# Patient Record
Sex: FEMALE | Race: WHITE | NOT HISPANIC OR LATINO | Employment: OTHER | URBAN - METROPOLITAN AREA
[De-identification: names, ages, dates, MRNs, and addresses within clinical notes are randomized per-mention and may not be internally consistent; named-entity substitution may affect disease eponyms.]

---

## 2017-02-07 ENCOUNTER — LAB (OUTPATIENT)
Dept: INTERNAL MEDICINE | Facility: CLINIC | Age: 57
End: 2017-02-07

## 2017-02-07 DIAGNOSIS — N39.3 STRESS INCONTINENCE IN FEMALE: ICD-10-CM

## 2017-02-07 DIAGNOSIS — M85.80 OSTEOPENIA: ICD-10-CM

## 2017-02-07 DIAGNOSIS — E55.9 VITAMIN D DEFICIENCY: ICD-10-CM

## 2017-02-07 DIAGNOSIS — E78.1 PURE HYPERGLYCERIDEMIA: Primary | ICD-10-CM

## 2017-02-07 DIAGNOSIS — Z00.00 ANNUAL PHYSICAL EXAM: ICD-10-CM

## 2017-02-07 LAB
25(OH)D3 SERPL-MCNC: 32.1 NG/ML
ALBUMIN SERPL-MCNC: 4.5 G/DL (ref 3.2–4.8)
ALBUMIN/GLOB SERPL: 1.5 G/DL (ref 1.5–2.5)
ALP SERPL-CCNC: 64 U/L (ref 25–100)
ALT SERPL W P-5'-P-CCNC: 34 U/L (ref 7–40)
ANION GAP SERPL CALCULATED.3IONS-SCNC: 7 MMOL/L (ref 3–11)
AST SERPL-CCNC: 35 U/L (ref 0–33)
BACTERIA UR QL AUTO: NORMAL /HPF
BASOPHILS # BLD AUTO: 0.04 10*3/MM3 (ref 0–0.2)
BASOPHILS NFR BLD AUTO: 0.8 % (ref 0–1)
BILIRUB SERPL-MCNC: 0.5 MG/DL (ref 0.3–1.2)
BILIRUB UR QL STRIP: NEGATIVE
BUN BLD-MCNC: 14 MG/DL (ref 9–23)
BUN/CREAT SERPL: 17.5 (ref 7–25)
CALCIUM SPEC-SCNC: 10.4 MG/DL (ref 8.7–10.4)
CHLORIDE SERPL-SCNC: 101 MMOL/L (ref 99–109)
CK SERPL-CCNC: 189 U/L (ref 26–174)
CLARITY UR: CLEAR
CO2 SERPL-SCNC: 29 MMOL/L (ref 20–31)
COLOR UR: YELLOW
CREAT BLD-MCNC: 0.8 MG/DL (ref 0.6–1.3)
DEPRECATED RDW RBC AUTO: 42.4 FL (ref 37–54)
EOSINOPHIL # BLD AUTO: 0.34 10*3/MM3 (ref 0.1–0.3)
EOSINOPHIL NFR BLD AUTO: 6.6 % (ref 0–3)
ERYTHROCYTE [DISTWIDTH] IN BLOOD BY AUTOMATED COUNT: 13 % (ref 11.3–14.5)
GFR SERPL CREATININE-BSD FRML MDRD: 74 ML/MIN/1.73
GLOBULIN UR ELPH-MCNC: 3 GM/DL
GLUCOSE BLD-MCNC: 89 MG/DL (ref 70–100)
GLUCOSE UR STRIP-MCNC: NEGATIVE MG/DL
HCT VFR BLD AUTO: 40.8 % (ref 34.5–44)
HCV AB SER DONR QL: NORMAL
HGB BLD-MCNC: 13.3 G/DL (ref 11.5–15.5)
HGB UR QL STRIP.AUTO: NEGATIVE
HYALINE CASTS UR QL AUTO: NORMAL /LPF
IMM GRANULOCYTES # BLD: 0.01 10*3/MM3 (ref 0–0.03)
IMM GRANULOCYTES NFR BLD: 0.2 % (ref 0–0.6)
KETONES UR QL STRIP: NEGATIVE
LEUKOCYTE ESTERASE UR QL STRIP.AUTO: ABNORMAL
LYMPHOCYTES # BLD AUTO: 1.61 10*3/MM3 (ref 0.6–4.8)
LYMPHOCYTES NFR BLD AUTO: 31 % (ref 24–44)
MCH RBC QN AUTO: 29.1 PG (ref 27–31)
MCHC RBC AUTO-ENTMCNC: 32.6 G/DL (ref 32–36)
MCV RBC AUTO: 89.3 FL (ref 80–99)
MONOCYTES # BLD AUTO: 0.5 10*3/MM3 (ref 0–1)
MONOCYTES NFR BLD AUTO: 9.6 % (ref 0–12)
NEUTROPHILS # BLD AUTO: 2.69 10*3/MM3 (ref 1.5–8.3)
NEUTROPHILS NFR BLD AUTO: 51.8 % (ref 41–71)
NITRITE UR QL STRIP: NEGATIVE
PH UR STRIP.AUTO: 5.5 [PH] (ref 5–8)
PLATELET # BLD AUTO: 337 10*3/MM3 (ref 150–450)
PMV BLD AUTO: 10 FL (ref 6–12)
POTASSIUM BLD-SCNC: 4.6 MMOL/L (ref 3.5–5.5)
PROT SERPL-MCNC: 7.5 G/DL (ref 5.7–8.2)
PROT UR QL STRIP: NEGATIVE
RBC # BLD AUTO: 4.57 10*6/MM3 (ref 3.89–5.14)
RBC # UR: NORMAL /HPF
REF LAB TEST METHOD: NORMAL
SODIUM BLD-SCNC: 137 MMOL/L (ref 132–146)
SP GR UR STRIP: 1.01 (ref 1–1.03)
SQUAMOUS #/AREA URNS HPF: NORMAL /HPF
T4 FREE SERPL-MCNC: 1.13 NG/DL (ref 0.89–1.76)
TSH SERPL DL<=0.05 MIU/L-ACNC: 3.21 MIU/ML (ref 0.35–5.35)
UROBILINOGEN UR QL STRIP: ABNORMAL
WBC NRBC COR # BLD: 5.19 10*3/MM3 (ref 3.5–10.8)
WBC UR QL AUTO: NORMAL /HPF

## 2017-02-07 PROCEDURE — 81001 URINALYSIS AUTO W/SCOPE: CPT | Performed by: INTERNAL MEDICINE

## 2017-02-07 PROCEDURE — 36415 COLL VENOUS BLD VENIPUNCTURE: CPT | Performed by: INTERNAL MEDICINE

## 2017-02-07 PROCEDURE — 85025 COMPLETE CBC W/AUTO DIFF WBC: CPT | Performed by: INTERNAL MEDICINE

## 2017-02-07 PROCEDURE — 82550 ASSAY OF CK (CPK): CPT | Performed by: INTERNAL MEDICINE

## 2017-02-07 PROCEDURE — 80053 COMPREHEN METABOLIC PANEL: CPT | Performed by: INTERNAL MEDICINE

## 2017-02-07 PROCEDURE — 86803 HEPATITIS C AB TEST: CPT | Performed by: INTERNAL MEDICINE

## 2017-02-07 PROCEDURE — 82306 VITAMIN D 25 HYDROXY: CPT | Performed by: INTERNAL MEDICINE

## 2017-02-07 PROCEDURE — 84443 ASSAY THYROID STIM HORMONE: CPT | Performed by: INTERNAL MEDICINE

## 2017-02-07 PROCEDURE — 84439 ASSAY OF FREE THYROXINE: CPT | Performed by: INTERNAL MEDICINE

## 2017-02-14 ENCOUNTER — OFFICE VISIT (OUTPATIENT)
Dept: INTERNAL MEDICINE | Facility: CLINIC | Age: 57
End: 2017-02-14

## 2017-02-14 VITALS
DIASTOLIC BLOOD PRESSURE: 70 MMHG | SYSTOLIC BLOOD PRESSURE: 120 MMHG | WEIGHT: 164 LBS | HEIGHT: 71 IN | BODY MASS INDEX: 22.96 KG/M2

## 2017-02-14 DIAGNOSIS — Z00.00 PE (PHYSICAL EXAM), ROUTINE: Primary | ICD-10-CM

## 2017-02-14 DIAGNOSIS — M85.80 OSTEOPENIA: ICD-10-CM

## 2017-02-14 DIAGNOSIS — Z78.0 MENOPAUSE: ICD-10-CM

## 2017-02-14 DIAGNOSIS — E78.1 PURE HYPERGLYCERIDEMIA: ICD-10-CM

## 2017-02-14 DIAGNOSIS — J30.2 SEASONAL ALLERGIC RHINITIS, UNSPECIFIED ALLERGIC RHINITIS TRIGGER: ICD-10-CM

## 2017-02-14 PROCEDURE — 99396 PREV VISIT EST AGE 40-64: CPT | Performed by: INTERNAL MEDICINE

## 2017-02-14 PROCEDURE — 93000 ELECTROCARDIOGRAM COMPLETE: CPT | Performed by: INTERNAL MEDICINE

## 2017-02-14 RX ORDER — FLUTICASONE PROPIONATE 50 MCG
2 SPRAY, SUSPENSION (ML) NASAL DAILY
COMMUNITY
Start: 2016-01-04 | End: 2018-09-21 | Stop reason: ALTCHOICE

## 2017-02-14 RX ORDER — ATORVASTATIN CALCIUM 10 MG/1
10 TABLET, FILM COATED ORAL NIGHTLY
COMMUNITY
Start: 2016-11-10 | End: 2017-02-14 | Stop reason: SDUPTHER

## 2017-02-14 RX ORDER — ATORVASTATIN CALCIUM 10 MG/1
10 TABLET, FILM COATED ORAL NIGHTLY
Qty: 90 TABLET | Refills: 3 | Status: SHIPPED | OUTPATIENT
Start: 2017-02-14 | End: 2018-02-27 | Stop reason: SDUPTHER

## 2017-02-14 RX ORDER — CHOLECALCIFEROL (VITAMIN D3) 25 MCG
1000 CAPSULE ORAL DAILY
COMMUNITY
Start: 2016-01-04

## 2017-02-14 RX ORDER — COVID-19 ANTIGEN TEST
220 KIT MISCELLANEOUS 2 TIMES DAILY PRN
COMMUNITY
Start: 2016-01-04

## 2017-02-14 RX ORDER — ETODOLAC 400 MG/1
400 TABLET, FILM COATED ORAL 2 TIMES DAILY
COMMUNITY
Start: 2013-01-18 | End: 2017-02-14

## 2017-02-14 RX ORDER — ALBUTEROL SULFATE 90 UG/1
AEROSOL, METERED RESPIRATORY (INHALATION)
COMMUNITY
Start: 2016-01-05 | End: 2017-09-28 | Stop reason: SDUPTHER

## 2017-02-14 NOTE — PROGRESS NOTES
"Chief Complaint   Patient presents with   • Annual Exam       History of Present Illness  56 y.o.  woman presents for updated phys examination.    Review of Systems  Denies headaches, visual changes, CP, palpitations, SOB, cough, abd pain, n/v/d, difficulty with urination, vaginal bleeding/discharge, numbness/tingling, falls, mood changes, lightheadedness, hearing changes, rashes.      ROS (+) using flonase and cont to have some nasal congestion, drainnage down back fo throat when lying down.  Has occ chest congestion and cough.  Denies fevers, wheezing, sore throat. All other ROS reviewed and negative.    [facility]  The following portions of the patient's history were reviewed and updated as appropriate: allergies, current medications, past family history, past medical history, past social history, past surgical history and problem list.      Current Outpatient Prescriptions:   •  albuterol (PROAIR HFA) 108 (90 BASE) MCG/ACT inhaler, ProAir  (90 Base) MCG/ACT Inhalation Aerosol Solution; Patient Sig: ProAir  (90 Base) MCG/ACT Inhalation Aerosol Solution INHALE 1 TO 2 PUFFS EVERY 4 TO 6 HOURS AS NEEDED.; 1; 1; 05-Jan-2016; Active, Disp: , Rfl:   •  atorvastatin (LIPITOR) 10 MG tablet, Take 1 tablet by mouth Every Night., Disp: 90 tablet, Rfl: 3  •  Cholecalciferol (VITAMIN D-3) 1000 UNITS capsule, Take 1,000 Units by mouth Daily., Disp: , Rfl:   •  fluticasone (FLONASE) 50 MCG/ACT nasal spray, 2 sprays into each nostril Daily., Disp: , Rfl:   •  Naproxen Sodium (ALEVE) 220 MG capsule, Take 220 mg by mouth 2 (Two) Times a Day As Needed., Disp: , Rfl:     Visit Vitals   • /70   • Ht 71\" (180.3 cm)   • Wt 164 lb (74.4 kg)   • BMI 22.87 kg/m2       Physical Exam   Constitutional: She is oriented to person, place, and time. She appears well-developed and well-nourished.   HENT:   Head: Normocephalic.   Right Ear: Tympanic membrane normal. No decreased hearing is noted.   Left Ear: Tympanic " membrane normal. Decreased hearing (no hearing - chronic) is noted.   Nose: Nose normal.   Mouth/Throat: Oropharynx is clear and moist and mucous membranes are normal. No oropharyngeal exudate.   Deformity of left ear with pinpoint opening - chronic, no hearing   Eyes: Conjunctivae and EOM are normal. Pupils are equal, round, and reactive to light.   Neck: Normal range of motion. Neck supple. Carotid bruit is not present. No thyromegaly present.   Cardiovascular: Normal heart sounds.  Bradycardia present.    Pulmonary/Chest: Effort normal and breath sounds normal. No respiratory distress.   Abdominal: Soft. Bowel sounds are normal. She exhibits no distension and no mass. There is no hepatosplenomegaly. There is no tenderness.   Genitourinary:   Genitourinary Comments: Breast exam unremarkable without masses, skin changes, nipple discharge, or axillary adenopathy.     Musculoskeletal: Normal range of motion. She exhibits no edema.   Lymphadenopathy:     She has no cervical adenopathy.   Neurological: She is alert and oriented to person, place, and time. She has normal strength and normal reflexes. She displays normal reflexes. No cranial nerve deficit or sensory deficit.   Skin: Skin is warm and dry. No rash noted.   Psychiatric: She has a normal mood and affect. Her behavior is normal.   Nursing note and vitals reviewed.        LABS  Results for orders placed or performed in visit on 02/07/17   Comprehensive Metabolic Panel   Result Value Ref Range    Glucose 89 70 - 100 mg/dL    BUN 14 9 - 23 mg/dL    Creatinine 0.80 0.60 - 1.30 mg/dL    Sodium 137 132 - 146 mmol/L    Potassium 4.6 3.5 - 5.5 mmol/L    Chloride 101 99 - 109 mmol/L    CO2 29.0 20.0 - 31.0 mmol/L    Calcium 10.4 8.7 - 10.4 mg/dL    Total Protein 7.5 5.7 - 8.2 g/dL    Albumin 4.50 3.20 - 4.80 g/dL    ALT (SGPT) 34 7 - 40 U/L    AST (SGOT) 35 (H) 0 - 33 U/L    Alkaline Phosphatase 64 25 - 100 U/L    Total Bilirubin 0.5 0.3 - 1.2 mg/dL    eGFR Non   Amer 74 >60 mL/min/1.73    Globulin 3.0 gm/dL    A/G Ratio 1.5 1.5 - 2.5 g/dL    BUN/Creatinine Ratio 17.5 7.0 - 25.0    Anion Gap 7.0 3.0 - 11.0 mmol/L   Vitamin D 25 Hydroxy   Result Value Ref Range    25 Hydroxy, Vitamin D 32.1 ng/ml   Urinalysis With / Microscopic If Indicated   Result Value Ref Range    Color, UA Yellow Yellow, Straw    Appearance, UA Clear Clear    pH, UA 5.5 5.0 - 8.0    Specific Gravity, UA 1.013 1.001 - 1.030    Glucose, UA Negative Negative    Ketones, UA Negative Negative    Bilirubin, UA Negative Negative    Blood, UA Negative Negative    Protein, UA Negative Negative    Leuk Esterase, UA Trace (A) Negative    Nitrite, UA Negative Negative    Urobilinogen, UA 0.2 E.U./dL 0.2 - 1.0 E.U./dL   CK   Result Value Ref Range    Creatine Kinase 189 (H) 26 - 174 U/L   TSH   Result Value Ref Range    TSH 3.213 0.350 - 5.350 mIU/mL   T4, Free   Result Value Ref Range    Free T4 1.13 0.89 - 1.76 ng/dL   Hepatitis C Antibody   Result Value Ref Range    Hepatitis C Ab Non-Reactive Non-Reactive   CBC Auto Differential   Result Value Ref Range    WBC 5.19 3.50 - 10.80 10*3/mm3    RBC 4.57 3.89 - 5.14 10*6/mm3    Hemoglobin 13.3 11.5 - 15.5 g/dL    Hematocrit 40.8 34.5 - 44.0 %    MCV 89.3 80.0 - 99.0 fL    MCH 29.1 27.0 - 31.0 pg    MCHC 32.6 32.0 - 36.0 g/dL    RDW 13.0 11.3 - 14.5 %    RDW-SD 42.4 37.0 - 54.0 fl    MPV 10.0 6.0 - 12.0 fL    Platelets 337 150 - 450 10*3/mm3    Neutrophil % 51.8 41.0 - 71.0 %    Lymphocyte % 31.0 24.0 - 44.0 %    Monocyte % 9.6 0.0 - 12.0 %    Eosinophil % 6.6 (H) 0.0 - 3.0 %    Basophil % 0.8 0.0 - 1.0 %    Immature Grans % 0.2 0.0 - 0.6 %    Neutrophils, Absolute 2.69 1.50 - 8.30 10*3/mm3    Lymphocytes, Absolute 1.61 0.60 - 4.80 10*3/mm3    Monocytes, Absolute 0.50 0.00 - 1.00 10*3/mm3    Eosinophils, Absolute 0.34 (H) 0.10 - 0.30 10*3/mm3    Basophils, Absolute 0.04 0.00 - 0.20 10*3/mm3    Immature Grans, Absolute 0.01 0.00 - 0.03 10*3/mm3   Urinalysis,  Microscopic Only   Result Value Ref Range    RBC, UA None Seen None Seen, 0-2 /HPF    WBC, UA None Seen None Seen /HPF    Bacteria, UA None Seen None Seen, Trace /HPF    Squamous Epithelial Cells, UA 0-2 None Seen, 0-2 /HPF    Hyaline Casts, UA None Seen 0 - 6 /LPF    Methodology Automated Microscopy        ECG 12 Lead  Date/Time: 2/14/2017 10:40 AM  Performed by: TODD JOHNSON  Authorized by: TODD JOHNSON   Comparison: compared with previous ECG from 1/4/2016  Similar to previous ECG  Rhythm: sinus rhythm  Rate: bradycardic  BPM: 51  Conduction: conduction normal  ST Segments: ST segments normal  T Waves: T waves normal  QRS axis: normal  Other findings: PRWP  Clinical impression comment: stable EKG            ASSESSMENT/PLAN  Problem List Items Addressed This Visit     Allergic rhinitis     Cont flonase, reviewed how to use correctly, and rec addition of netipot; prn mucinex DM         Hyperlipidemia     Stable lipids, LFTs, CPK on atorvastatin 10mg QHS #90, 3RF         Relevant Medications    atorvastatin (LIPITOR) 10 MG tablet    Osteopenia     Calcium and vitamin D supplementation with weight-bearing exercise; DEXA ordered (last 1/14)            PE (physical exam), routine - Primary     Health maintenance - declines flu vaccine; Tdap 10/11; rec looking into Zostavax coverage; mammo ordered (last 3/15); Pap stable 1/4/16; nl DEXA 1/14, repeat 2017-18; colonosc 12/23/08, repeat 2018 per Dr. Stewart; stable EKG 1/16 (sinus bethany); eye exam to be updated; dental exam UTD, done every 6 mos                 FOLLOW-UP   RTC 1yr for annual PE    Electronically signed by:    Todd Johnson MD  02/14/2017

## 2017-02-14 NOTE — ASSESSMENT & PLAN NOTE
Health maintenance - declines flu vaccine; Tdap 10/11; rec looking into Zostavax coverage; mammo ordered (last 3/15); Pap stable 1/4/16; nl DEXA 1/14, repeat 2017-18; colonosc 12/23/08, repeat 2018 per Dr. Stewart; stable EKG 1/16 (sinus bethany); eye exam to be updated; dental exam UTD, done every 6 mos

## 2017-02-15 LAB
ARTICHOKE IGE QN: 81 MG/DL (ref 0–130)
CHOLEST SERPL-MCNC: 191 MG/DL (ref 0–200)
HDLC SERPL-MCNC: 98 MG/DL (ref 40–60)
TRIGL SERPL-MCNC: 69 MG/DL (ref 0–150)

## 2017-02-15 PROCEDURE — 80061 LIPID PANEL: CPT | Performed by: INTERNAL MEDICINE

## 2017-03-02 ENCOUNTER — HOSPITAL ENCOUNTER (OUTPATIENT)
Dept: BONE DENSITY | Facility: HOSPITAL | Age: 57
Discharge: HOME | End: 2017-03-02
Attending: INTERNAL MEDICINE | Admitting: INTERNAL MEDICINE

## 2017-03-02 DIAGNOSIS — Z78.0 MENOPAUSE: ICD-10-CM

## 2017-03-02 PROCEDURE — 77080 DXA BONE DENSITY AXIAL: CPT | Performed by: RADIOLOGY

## 2017-03-02 PROCEDURE — 77080 DXA BONE DENSITY AXIAL: CPT

## 2017-03-02 NOTE — PROGRESS NOTES
Dear Ms. Guajardo,    Thank you for obtaining your DEXA (bone density) scan.  I have received those results and would like to review them with you.      Your bone density remains in the osteopenic range.  This is between normal and osteoporosis and is mildly worsened as compared to your last DEXA in 2014.  This indicates that your risk of a major fracture in the next 10 years is 7.2%.    Please maintain regular calcium and vitamin D supplementation.  Calcium intake should be 1000mg per day between diet and supplements.  Weight bearing exercise is good for bone health as well.    We should plan to repeat your DEXA in 2-3 years.  Please let me know if you have any questions or concerns regarding these results.        Sincerely,  Alia Limon MD

## 2017-07-05 ENCOUNTER — TRANSCRIBE ORDERS (OUTPATIENT)
Dept: ADMINISTRATIVE | Facility: HOSPITAL | Age: 57
End: 2017-07-05

## 2017-07-05 DIAGNOSIS — Z12.31 VISIT FOR SCREENING MAMMOGRAM: Primary | ICD-10-CM

## 2017-08-04 ENCOUNTER — HOSPITAL ENCOUNTER (OUTPATIENT)
Dept: MAMMOGRAPHY | Facility: HOSPITAL | Age: 57
Discharge: HOME | End: 2017-08-04
Attending: INTERNAL MEDICINE | Admitting: INTERNAL MEDICINE

## 2017-08-04 DIAGNOSIS — Z12.31 VISIT FOR SCREENING MAMMOGRAM: ICD-10-CM

## 2017-08-04 PROCEDURE — 77063 BREAST TOMOSYNTHESIS BI: CPT | Performed by: RADIOLOGY

## 2017-08-04 PROCEDURE — 77063 BREAST TOMOSYNTHESIS BI: CPT

## 2017-08-04 PROCEDURE — G0202 SCR MAMMO BI INCL CAD: HCPCS

## 2017-08-04 PROCEDURE — 77067 SCR MAMMO BI INCL CAD: CPT | Performed by: RADIOLOGY

## 2017-09-28 ENCOUNTER — TELEPHONE (OUTPATIENT)
Dept: INTERNAL MEDICINE | Facility: CLINIC | Age: 57
End: 2017-09-28

## 2017-09-28 NOTE — TELEPHONE ENCOUNTER
WOULD LIKE TO CONFIRM THE QUANTITY OF INHALERS TO GIVE PATIENT. WASN'T SURE SINCE THE PATIENT WAS PRESCRIBED (18) (8.5 G) PHARMACIST WAS CONFUSED IF SHE SHOULD GIVE HER ONE OR TWO OF THERE PRO HEALTH ALBUTEROL.

## 2017-11-01 ENCOUNTER — OFFICE VISIT (OUTPATIENT)
Dept: INTERNAL MEDICINE | Facility: CLINIC | Age: 57
End: 2017-11-01

## 2017-11-01 VITALS
BODY MASS INDEX: 23.24 KG/M2 | DIASTOLIC BLOOD PRESSURE: 76 MMHG | HEART RATE: 58 BPM | OXYGEN SATURATION: 98 % | WEIGHT: 166 LBS | HEIGHT: 71 IN | SYSTOLIC BLOOD PRESSURE: 122 MMHG

## 2017-11-01 DIAGNOSIS — J01.00 ACUTE NON-RECURRENT MAXILLARY SINUSITIS: Primary | ICD-10-CM

## 2017-11-01 DIAGNOSIS — J20.9 ACUTE BRONCHITIS, UNSPECIFIED ORGANISM: ICD-10-CM

## 2017-11-01 PROCEDURE — 99213 OFFICE O/P EST LOW 20 MIN: CPT | Performed by: NURSE PRACTITIONER

## 2017-11-01 RX ORDER — DEXTROMETHORPHAN HYDROBROMIDE AND PROMETHAZINE HYDROCHLORIDE 15; 6.25 MG/5ML; MG/5ML
SYRUP ORAL
Qty: 100 ML | Refills: 0 | Status: SHIPPED | OUTPATIENT
Start: 2017-11-01 | End: 2018-02-15

## 2017-11-01 RX ORDER — AMOXICILLIN AND CLAVULANATE POTASSIUM 875; 125 MG/1; MG/1
1 TABLET, FILM COATED ORAL 2 TIMES DAILY
Qty: 14 TABLET | Refills: 0 | Status: SHIPPED | OUTPATIENT
Start: 2017-11-01 | End: 2017-11-08

## 2017-11-01 RX ORDER — PREDNISONE 20 MG/1
TABLET ORAL
Qty: 21 TABLET | Refills: 0 | Status: SHIPPED | OUTPATIENT
Start: 2017-11-01 | End: 2017-12-08

## 2017-11-01 RX ORDER — ALBUTEROL SULFATE 90 UG/1
2 AEROSOL, METERED RESPIRATORY (INHALATION) EVERY 4 HOURS PRN
COMMUNITY
End: 2019-03-01 | Stop reason: SDUPTHER

## 2017-11-01 NOTE — PROGRESS NOTES
Chief Complaint   Patient presents with   • Bronchitis     pt was dx'd in September and it is not getting better.        HPI  Memo Guajardo is a 57 y.o. female  presents with complaint of URI symptoms.    She was dx'd with bronchitis in September; was advised by Dr. Limon to take mucinex in am and antihistamine at night.  Felt better for a little while, but symptoms have returned.  Today, she presents with severe head congestion with green sputum from nose; headaches, pressure in face; ears feel full; PND; persistent cough with greenish sputum, worse when lying down; wheezing, chest tightness with exertion. possible fever    She denies body aches, n/v/d.    She has been using the mucinex dm and antihistamines, but no longer receives relief from these medications.    Past Medical History:   Diagnosis Date   • Cervical dysplasia 1990    s/p colpo/cryotherapy ('90)   • History of EMG     nl EMG RUE/RLE (11/12); neuro - Dr. Cuenca   • Hx of bone density study 10/2010    DEXA (10/10) L+, H -1.11   • Hx of bone density study 03/02/2017    DEXA (3/17) L0.6, H -1.4, repeat 2-3 yrs   • Hx of colonoscopy 12/23/2008    colonosc (12/08): int hem, diverticulosis, neg bxs; repeat 10yrs; CRS - Dr. Stewart   • Hx of colonoscopy 07/25/2017    colonosc (7/25/17): diverticulosis, repeat 10 yrs; CRS - Dr. Stewart   • Hx of echocardiogram 08/2012    nl ECHO (8/12)   • Hyperthyroidism 10/2010    secondary to subacute thyroiditis (10/10)   • Travel advice encounter      upcoming trip to [facility] end of 6/15; malaria prophylaxis and cipro RXs escribed as requested a       Family History   Problem Relation Age of Onset   • Hypothyroidism Mother    • Hyperlipidemia Mother    • Heart disease Father    • Hypothyroidism Sister    • Arrhythmia Sister    • Heart disease Paternal Grandmother    • Heart disease Paternal Grandfather    • Breast cancer Neg Hx    • Ovarian cancer Neg Hx        Social History     Social History   • Marital status:  "     Spouse name: N/A   • Number of children: N/A   • Years of education: N/A     Occupational History   •       Social History Main Topics   • Smoking status: Never Smoker   • Smokeless tobacco: Never Used   • Alcohol use Yes      Comment: social   • Drug use: Not on file   • Sexual activity: Not on file     Other Topics Concern   • Not on file     Social History Narrative    3 brothers and 1 sister        Enjoys golf       Review of Systems   Constitutional: Positive for appetite change and fatigue. Negative for activity change.   HENT: Positive for congestion, postnasal drip, sinus pain, sinus pressure and sore throat. Negative for ear pain.    Respiratory: Positive for cough, chest tightness and wheezing.    Cardiovascular: Negative for chest pain.   Gastrointestinal: Negative for diarrhea, nausea and vomiting.   Neurological: Positive for headaches. Negative for dizziness.   All other systems reviewed and are negative.        /76  Pulse 58  Ht 71\" (180.3 cm)  Wt 166 lb (75.3 kg)  SpO2 98%  BMI 23.15 kg/m2    Physical Exam   Constitutional: She is oriented to person, place, and time. She appears well-developed and well-nourished.   HENT:   Head: Normocephalic and atraumatic.   Right Ear: External ear normal.   Left Ear: External ear normal.   Eyes: Conjunctivae are normal.   Neck: Trachea normal and normal range of motion. Neck supple. No JVD present. No thyromegaly present.   Cardiovascular: Normal rate, regular rhythm and normal heart sounds.    No murmur heard.  Pulmonary/Chest: Effort normal. She has no decreased breath sounds. She has wheezes (insp and exp) in the right upper field and the left upper field. She has no rhonchi. She has no rales.   Lymphadenopathy:     She has cervical adenopathy (bilateral ant cervical with mild tenderness).        Right cervical: No posterior cervical adenopathy present.       Left cervical: No posterior cervical adenopathy present. "   Neurological: She is alert and oriented to person, place, and time.   Skin: Skin is warm, dry and intact.   Vitals reviewed.    Assessment/Plan    1. Acute non-recurrent maxillary sinusitis  - amoxicillin-clavulanate (AUGMENTIN) 875-125 MG per tablet; Take 1 tablet by mouth 2 (Two) Times a Day for 7 days.  Dispense: 14 tablet; Refill: 0    2. Acute bronchitis, unspecified organism  - predniSONE (DELTASONE) 20 MG tablet; 3 tabs on days 1, 2, 3; 2 tabs on days 4, 5, 6, 7; 1 tab on days 8, 9, 10, 11  Dispense: 21 tablet; Refill: 0  - promethazine-dextromethorphan (PROMETHAZINE-DM) 6.25-15 MG/5ML syrup; Take at bedtime  Dispense: 100 mL; Refill: 0    Increase fluid intake; rest when possible; warm salt water gargles; cough drops/throat lozenges; ibuprofen/tylenol for pain/fever as needed.    F/U if symptoms do not improve or worsen; otherwise keep next scheduled appointment with Dr. Limon.    Patient verbalizes understanding of medication dosage, comfort measures, instructions for treatment and follow-up.    Isabell Pal, APRN  11/01/2017

## 2017-12-08 ENCOUNTER — OFFICE VISIT (OUTPATIENT)
Dept: INTERNAL MEDICINE | Facility: CLINIC | Age: 57
End: 2017-12-08

## 2017-12-08 VITALS
HEART RATE: 72 BPM | TEMPERATURE: 97.9 F | HEIGHT: 71 IN | OXYGEN SATURATION: 98 % | BODY MASS INDEX: 23.32 KG/M2 | SYSTOLIC BLOOD PRESSURE: 118 MMHG | DIASTOLIC BLOOD PRESSURE: 76 MMHG | WEIGHT: 166.6 LBS

## 2017-12-08 DIAGNOSIS — J01.00 ACUTE MAXILLARY SINUSITIS, RECURRENCE NOT SPECIFIED: Primary | ICD-10-CM

## 2017-12-08 DIAGNOSIS — J06.9 UPPER RESPIRATORY TRACT INFECTION, UNSPECIFIED TYPE: ICD-10-CM

## 2017-12-08 PROCEDURE — 94640 AIRWAY INHALATION TREATMENT: CPT | Performed by: NURSE PRACTITIONER

## 2017-12-08 PROCEDURE — 96372 THER/PROPH/DIAG INJ SC/IM: CPT | Performed by: NURSE PRACTITIONER

## 2017-12-08 PROCEDURE — 99213 OFFICE O/P EST LOW 20 MIN: CPT | Performed by: NURSE PRACTITIONER

## 2017-12-08 RX ORDER — PREDNISONE 20 MG/1
TABLET ORAL
Qty: 19 TABLET | Refills: 0 | Status: SHIPPED | OUTPATIENT
Start: 2017-12-08 | End: 2017-12-27

## 2017-12-08 RX ORDER — SULFAMETHOXAZOLE AND TRIMETHOPRIM 800; 160 MG/1; MG/1
1 TABLET ORAL 2 TIMES DAILY
Qty: 20 TABLET | Refills: 0 | Status: SHIPPED | OUTPATIENT
Start: 2017-12-08 | End: 2017-12-27

## 2017-12-08 RX ORDER — METHYLPREDNISOLONE ACETATE 40 MG/ML
40 INJECTION, SUSPENSION INTRA-ARTICULAR; INTRALESIONAL; INTRAMUSCULAR; SOFT TISSUE ONCE
Status: COMPLETED | OUTPATIENT
Start: 2017-12-08 | End: 2017-12-08

## 2017-12-08 RX ORDER — IPRATROPIUM BROMIDE AND ALBUTEROL SULFATE 2.5; .5 MG/3ML; MG/3ML
3 SOLUTION RESPIRATORY (INHALATION) ONCE
Status: COMPLETED | OUTPATIENT
Start: 2017-12-08 | End: 2017-12-08

## 2017-12-08 RX ADMIN — METHYLPREDNISOLONE ACETATE 40 MG: 40 INJECTION, SUSPENSION INTRA-ARTICULAR; INTRALESIONAL; INTRAMUSCULAR; SOFT TISSUE at 08:38

## 2017-12-08 RX ADMIN — IPRATROPIUM BROMIDE AND ALBUTEROL SULFATE 3 ML: 2.5; .5 SOLUTION RESPIRATORY (INHALATION) at 08:39

## 2017-12-08 NOTE — PROGRESS NOTES
"Subjective  Follow-up (bronchitis)      Memo Guajardo is a 57 y.o. female.   No Known Allergies  History of Present Illness      Was seen 11/1/17, was dx with amoxil and prednisone and cleared it up for 2 weeks. . Around last week of November started with facial pain and pressure and loud wheezing. Cough with small amt production, fatigue. No fever/chills. Has tried cough syrup and dayquil, sudafed and no relief  Has used rescue inhaler with temporary relief  netti pot no relief  Neg smoker   Has had ill contacts   The following portions of the patient's history were reviewed and updated as appropriate: allergies, past family history and problem list.    Review of Systems   Constitutional: Positive for fatigue. Negative for chills and fever.   HENT: Positive for sinus pressure.    Respiratory: Positive for cough and wheezing.    All other systems reviewed and are negative.      Objective   Physical Exam   Constitutional: She is oriented to person, place, and time. She appears well-developed and well-nourished.   Obviously does not feel well   HENT:   Head: Normocephalic and atraumatic.   Nose: Left sinus exhibits maxillary sinus tenderness.   Mouth/Throat: Oropharynx is clear and moist.   Eyes: Conjunctivae are normal.   Cardiovascular: Normal rate.    Pulmonary/Chest: Effort normal. She has no decreased breath sounds. She has wheezes (all fields).   Lymphadenopathy:     She has no cervical adenopathy.   Neurological: She is alert and oriented to person, place, and time.   Skin: Skin is warm and dry.   Psychiatric: She has a normal mood and affect. Her behavior is normal. Judgment and thought content normal.   Nursing note and vitals reviewed.    /76  Pulse 72  Temp 97.9 °F (36.6 °C)  Ht 180.3 cm (70.98\")  Wt 75.6 kg (166 lb 9.6 oz)  SpO2 98%  BMI 23.25 kg/m2    Assessment/Plan     Problem List Items Addressed This Visit     None      Visit Diagnoses     Acute maxillary sinusitis, recurrence not " specified    -  Primary    Relevant Medications    methylPREDNISolone acetate (DEPO-medrol) injection 40 mg (Start on 12/8/2017  9:15 AM)    sulfamethoxazole-trimethoprim (BACTRIM DS,SEPTRA DS) 800-160 MG per tablet    predniSONE (DELTASONE) 20 MG tablet    Upper respiratory tract infection, unspecified type        Relevant Medications    methylPREDNISolone acetate (DEPO-medrol) injection 40 mg (Start on 12/8/2017  9:15 AM)    ipratropium-albuterol (DUO-NEB) nebulizer solution 3 mL (Start on 12/8/2017  9:15 AM)    predniSONE (DELTASONE) 20 MG tablet          Start steroid taper in the morning. Increase fluids, may use rescue inhaler q4-6 h prn wheezing. rtc 72h if no relief o/w keep all appts with DR Limon

## 2017-12-20 ENCOUNTER — TELEPHONE (OUTPATIENT)
Dept: INTERNAL MEDICINE | Facility: CLINIC | Age: 57
End: 2017-12-20

## 2017-12-27 ENCOUNTER — HOSPITAL ENCOUNTER (OUTPATIENT)
Dept: GENERAL RADIOLOGY | Facility: HOSPITAL | Age: 57
Discharge: HOME | End: 2017-12-27
Admitting: NURSE PRACTITIONER

## 2017-12-27 ENCOUNTER — OFFICE VISIT (OUTPATIENT)
Dept: INTERNAL MEDICINE | Facility: CLINIC | Age: 57
End: 2017-12-27

## 2017-12-27 VITALS
OXYGEN SATURATION: 98 % | WEIGHT: 167 LBS | SYSTOLIC BLOOD PRESSURE: 126 MMHG | BODY MASS INDEX: 23.38 KG/M2 | DIASTOLIC BLOOD PRESSURE: 74 MMHG | HEART RATE: 68 BPM | HEIGHT: 71 IN

## 2017-12-27 DIAGNOSIS — J20.9 ACUTE BRONCHITIS, UNSPECIFIED ORGANISM: Primary | ICD-10-CM

## 2017-12-27 DIAGNOSIS — J01.01 ACUTE RECURRENT MAXILLARY SINUSITIS: ICD-10-CM

## 2017-12-27 DIAGNOSIS — J20.9 ACUTE BRONCHITIS, UNSPECIFIED ORGANISM: ICD-10-CM

## 2017-12-27 PROCEDURE — 99213 OFFICE O/P EST LOW 20 MIN: CPT | Performed by: NURSE PRACTITIONER

## 2017-12-27 PROCEDURE — 71020 HC CHEST PA AND LATERAL: CPT
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
## 2017-12-28 ENCOUNTER — TELEPHONE (OUTPATIENT)
Dept: INTERNAL MEDICINE | Facility: CLINIC | Age: 57
End: 2017-12-28

## 2017-12-28 NOTE — TELEPHONE ENCOUNTER
----- Message from JG Huertas sent at 12/28/2017  7:27 AM EST -----  Please notify that her chest xray is clear

## 2018-02-15 ENCOUNTER — OFFICE VISIT (OUTPATIENT)
Dept: INTERNAL MEDICINE | Facility: CLINIC | Age: 58
End: 2018-02-15

## 2018-02-15 VITALS
OXYGEN SATURATION: 98 % | WEIGHT: 161.25 LBS | SYSTOLIC BLOOD PRESSURE: 138 MMHG | RESPIRATION RATE: 18 BRPM | BODY MASS INDEX: 22.5 KG/M2 | DIASTOLIC BLOOD PRESSURE: 70 MMHG | HEART RATE: 72 BPM

## 2018-02-15 DIAGNOSIS — J30.2 CHRONIC SEASONAL ALLERGIC RHINITIS, UNSPECIFIED TRIGGER: ICD-10-CM

## 2018-02-15 DIAGNOSIS — Z00.00 ROUTINE HEALTH MAINTENANCE: ICD-10-CM

## 2018-02-15 DIAGNOSIS — E55.9 VITAMIN D DEFICIENCY: ICD-10-CM

## 2018-02-15 DIAGNOSIS — E78.00 PURE HYPERCHOLESTEROLEMIA: ICD-10-CM

## 2018-02-15 DIAGNOSIS — J45.51 SEVERE PERSISTENT ASTHMA WITH ACUTE EXACERBATION: Primary | ICD-10-CM

## 2018-02-15 PROCEDURE — 99214 OFFICE O/P EST MOD 30 MIN: CPT | Performed by: INTERNAL MEDICINE

## 2018-02-15 PROCEDURE — 94060 EVALUATION OF WHEEZING: CPT | Performed by: INTERNAL MEDICINE

## 2018-02-15 RX ORDER — BUDESONIDE AND FORMOTEROL FUMARATE DIHYDRATE 80; 4.5 UG/1; UG/1
AEROSOL RESPIRATORY (INHALATION)
Qty: 1 INHALER | Refills: 2 | Status: SHIPPED | OUTPATIENT
Start: 2018-02-15 | End: 2018-08-27 | Stop reason: SDUPTHER

## 2018-02-15 NOTE — PROGRESS NOTES
"Chief Complaint   Patient presents with   • Breathing Problem       History of Present Illness  57 y.o.  woman presents for further eval of recurrent cold sxs.  Had cold sxs in  with abx and pred.  Then recurrent sxs beginning  with bactrim and prednisone.  Notes s/e of pred with rash and diarrhea.  Then seen at end of  and given dulera, which seemed to temporarily.  Prev with sinus congestion and teeth pain.    Since 2 weeks ago, increased cough, productive of green or white sputum.  Notes increased cough with phys activity.  Has been taking some mucinex DM which provides some relief but with \"gaps\" in coverage.  Has had increased chest tightness with phys activity as well.  Has had some wheezing, not worsened with walking.  Denies fevers.      Review of Systems  ROS (+) for productive cough, chest tightness, wheezing.  ROS (+) for nasal congestion productive of green/white sputum.  Denies fevers, chills, sweats.  Currenty without teeth pain.  Ears are asx; denies sore throat.  Denies n/v. Denies nighttime cough; cough worsened with phys activity.  Also some difficulties playing instruments due to chest tightness and coughing.     Previously only with intermittent use of albuterol; usually the inhaler would .All other ROS reviewed and negative.    [facility]  The following portions of the patient's history were reviewed and updated as appropriate: allergies, current medications, past family history, past medical history, past social history, past surgical history and problem list.    Current Outpatient Prescriptions:   •  albuterol (PROAIR HFA) 108 (90 Base) MCG/ACT inhaler,prn  •  atorvastatin (LIPITOR) 10 MG tablet, QD  •  Cholecalciferol (VITAMIN D-3) 1000 UNITS capsule, QD  •  fluticasone (FLONASE) 50 MCG/ACT 2 sprays/nostril QD  •  Naproxen Sodium (ALEVE) 220 MG capsule, BID prn    VITALS:  /70 (BP Location: Right arm, Patient Position: Sitting)  Pulse 72  Resp 18  Wt 73.1 kg " (161 lb 4 oz)  SpO2 98%  BMI 22.5 kg/m2    Physical Exam   Constitutional: She is oriented to person, place, and time. She appears well-developed and well-nourished.   HENT:   Right Ear: External ear normal.   Left Ear: External ear normal.   Mouth/Throat: Oropharynx is clear and moist. No oropharyngeal exudate (posterior drainage without exudate, swelling, erythema).   Nasal mucosa mod swollen bilaterally without erythema     Eyes: Conjunctivae and EOM are normal.   Cardiovascular: Normal rate, regular rhythm and normal heart sounds.    Pulmonary/Chest: Effort normal. No respiratory distress. She has wheezes (diffuse exp wheezing). She has no rales. She exhibits no tenderness.   Speaks in complete sentences; rare cough with significant congestion   Abdominal: Soft. Bowel sounds are normal.   Lymphadenopathy:     She has no cervical adenopathy.   Neurological: She is alert and oriented to person, place, and time.   Skin: Skin is warm and dry. No rash noted.   Psychiatric: She has a normal mood and affect. Her behavior is normal.   Nursing note and vitals reviewed.      LABS  12/27/17 nl CXR except degen changes    PFTs: dx - SOB, cough, asthma  The degree of obstruction is severe with FEV1 of 31 % predicted.    FEV1 31 %, FVC 45 %, FEV1/FVC 69, (+) post-BDR        ASSESSMENT/PLAN  Problem List Items Addressed This Visit     Allergic rhinitis     Cont flonase, antihistamine and Netipot use; discussed PND could exacerbate underlying asthma          Severe persistent asthma with acute exacerbation - Primary     NEW DX; abnl PFTs showing severe obstruction; proceed with Symbicort 80/4.5 2 puffs BID, gargle spit after puffs #1, 2RF - reviewed how to use correctly; voucher given; has f/u in 2 weeks for PE but likely wait 4 weeks to repeat PFTs           Relevant Medications    budesonide-formoterol (SYMBICORT) 80-4.5 MCG/ACT inhaler      Time Documentation  Counseled patient  Counseling topics: diagnosis, lab results,  treatment options and return instructions  Total encounter time: counseling time more than 50% of visit: 25 minutes      FOLLOW-UP  1. Health maintenance - flu vacc strongly recommended but refused  2. RTC 2/27/18 as scheduled for PE; fasting labs the week prior to appt (CBC, CMP, TSH, lipids, UA/micro, CPK, vit D) - escribed      Electronically signed by:    Alia Limon MD  02/15/2018

## 2018-02-15 NOTE — ASSESSMENT & PLAN NOTE
NEW DX; abnl PFTs showing severe obstruction; proceed with Symbicort 80/4.5 2 puffs BID, gargle spit after puffs #1, 2RF - reviewed how to use correctly; voucher given; has f/u in 2 weeks for PE but likely wait 4 weeks to repeat PFTs

## 2018-02-19 ENCOUNTER — LAB (OUTPATIENT)
Dept: INTERNAL MEDICINE | Facility: CLINIC | Age: 58
End: 2018-02-19

## 2018-02-19 DIAGNOSIS — Z00.00 ROUTINE HEALTH MAINTENANCE: ICD-10-CM

## 2018-02-19 DIAGNOSIS — E55.9 VITAMIN D DEFICIENCY: ICD-10-CM

## 2018-02-19 DIAGNOSIS — E78.00 PURE HYPERCHOLESTEROLEMIA: ICD-10-CM

## 2018-02-19 DIAGNOSIS — R73.01 IMPAIRED FASTING GLUCOSE: Primary | ICD-10-CM

## 2018-02-19 LAB
25(OH)D3 SERPL-MCNC: 25.8 NG/ML
ALBUMIN SERPL-MCNC: 4.3 G/DL (ref 3.2–4.8)
ALBUMIN/GLOB SERPL: 1.7 G/DL (ref 1.5–2.5)
ALP SERPL-CCNC: 74 U/L (ref 25–100)
ALT SERPL W P-5'-P-CCNC: 28 U/L (ref 7–40)
ANION GAP SERPL CALCULATED.3IONS-SCNC: 7 MMOL/L (ref 3–11)
ARTICHOKE IGE QN: 74 MG/DL (ref 0–130)
AST SERPL-CCNC: 27 U/L (ref 0–33)
BACTERIA UR QL AUTO: NORMAL /HPF
BASOPHILS # BLD AUTO: 0.06 10*3/MM3 (ref 0–0.2)
BASOPHILS NFR BLD AUTO: 0.9 % (ref 0–1)
BILIRUB SERPL-MCNC: 0.5 MG/DL (ref 0.3–1.2)
BILIRUB UR QL STRIP: NEGATIVE
BUN BLD-MCNC: 11 MG/DL (ref 9–23)
BUN/CREAT SERPL: 13.8 (ref 7–25)
CALCIUM SPEC-SCNC: 9.3 MG/DL (ref 8.7–10.4)
CHLORIDE SERPL-SCNC: 103 MMOL/L (ref 99–109)
CHOLEST SERPL-MCNC: 184 MG/DL (ref 0–200)
CK SERPL-CCNC: 115 U/L (ref 26–174)
CLARITY UR: CLEAR
CO2 SERPL-SCNC: 28 MMOL/L (ref 20–31)
COLOR UR: YELLOW
CREAT BLD-MCNC: 0.8 MG/DL (ref 0.6–1.3)
DEPRECATED RDW RBC AUTO: 47.2 FL (ref 37–54)
EOSINOPHIL # BLD AUTO: 0.99 10*3/MM3 (ref 0–0.3)
EOSINOPHIL NFR BLD AUTO: 14.5 % (ref 0–3)
ERYTHROCYTE [DISTWIDTH] IN BLOOD BY AUTOMATED COUNT: 14.3 % (ref 11.3–14.5)
GFR SERPL CREATININE-BSD FRML MDRD: 73 ML/MIN/1.73
GLOBULIN UR ELPH-MCNC: 2.6 GM/DL
GLUCOSE BLD-MCNC: 96 MG/DL (ref 70–100)
GLUCOSE UR STRIP-MCNC: NEGATIVE MG/DL
HCT VFR BLD AUTO: 40.5 % (ref 34.5–44)
HDLC SERPL-MCNC: 94 MG/DL (ref 40–60)
HGB BLD-MCNC: 12.8 G/DL (ref 11.5–15.5)
HGB UR QL STRIP.AUTO: NEGATIVE
HYALINE CASTS UR QL AUTO: NORMAL /LPF
IMM GRANULOCYTES # BLD: 0.01 10*3/MM3 (ref 0–0.03)
IMM GRANULOCYTES NFR BLD: 0.1 % (ref 0–0.6)
KETONES UR QL STRIP: NEGATIVE
LEUKOCYTE ESTERASE UR QL STRIP.AUTO: NEGATIVE
LYMPHOCYTES # BLD AUTO: 2.07 10*3/MM3 (ref 0.6–4.8)
LYMPHOCYTES NFR BLD AUTO: 30.3 % (ref 24–44)
MCH RBC QN AUTO: 28.4 PG (ref 27–31)
MCHC RBC AUTO-ENTMCNC: 31.6 G/DL (ref 32–36)
MCV RBC AUTO: 90 FL (ref 80–99)
MONOCYTES # BLD AUTO: 0.58 10*3/MM3 (ref 0–1)
MONOCYTES NFR BLD AUTO: 8.5 % (ref 0–12)
NEUTROPHILS # BLD AUTO: 3.13 10*3/MM3 (ref 1.5–8.3)
NEUTROPHILS NFR BLD AUTO: 45.7 % (ref 41–71)
NITRITE UR QL STRIP: NEGATIVE
PH UR STRIP.AUTO: 8.5 [PH] (ref 5–8)
PLATELET # BLD AUTO: 371 10*3/MM3 (ref 150–450)
PMV BLD AUTO: 10.1 FL (ref 6–12)
POTASSIUM BLD-SCNC: 4.5 MMOL/L (ref 3.5–5.5)
PROT SERPL-MCNC: 6.9 G/DL (ref 5.7–8.2)
PROT UR QL STRIP: NEGATIVE
RBC # BLD AUTO: 4.5 10*6/MM3 (ref 3.89–5.14)
RBC # UR: NORMAL /HPF
REF LAB TEST METHOD: NORMAL
SODIUM BLD-SCNC: 138 MMOL/L (ref 132–146)
SP GR UR STRIP: 1.02 (ref 1–1.03)
SQUAMOUS #/AREA URNS HPF: NORMAL /HPF
T4 FREE SERPL-MCNC: 1.12 NG/DL (ref 0.89–1.76)
TRIGL SERPL-MCNC: 50 MG/DL (ref 0–150)
TSH SERPL DL<=0.05 MIU/L-ACNC: 2.36 MIU/ML (ref 0.35–5.35)
UROBILINOGEN UR QL STRIP: ABNORMAL
WBC NRBC COR # BLD: 6.84 10*3/MM3 (ref 3.5–10.8)
WBC UR QL AUTO: NORMAL /HPF

## 2018-02-19 PROCEDURE — 81001 URINALYSIS AUTO W/SCOPE: CPT | Performed by: INTERNAL MEDICINE

## 2018-02-19 PROCEDURE — 85025 COMPLETE CBC W/AUTO DIFF WBC: CPT | Performed by: INTERNAL MEDICINE

## 2018-02-19 PROCEDURE — 80053 COMPREHEN METABOLIC PANEL: CPT | Performed by: INTERNAL MEDICINE

## 2018-02-19 PROCEDURE — 84439 ASSAY OF FREE THYROXINE: CPT | Performed by: INTERNAL MEDICINE

## 2018-02-19 PROCEDURE — 82550 ASSAY OF CK (CPK): CPT | Performed by: INTERNAL MEDICINE

## 2018-02-19 PROCEDURE — 80061 LIPID PANEL: CPT | Performed by: INTERNAL MEDICINE

## 2018-02-19 PROCEDURE — 82306 VITAMIN D 25 HYDROXY: CPT | Performed by: INTERNAL MEDICINE

## 2018-02-19 PROCEDURE — 83036 HEMOGLOBIN GLYCOSYLATED A1C: CPT | Performed by: INTERNAL MEDICINE

## 2018-02-19 PROCEDURE — 84443 ASSAY THYROID STIM HORMONE: CPT | Performed by: INTERNAL MEDICINE

## 2018-02-20 PROBLEM — R73.01 IMPAIRED FASTING GLUCOSE: Status: ACTIVE | Noted: 2018-02-20

## 2018-02-20 LAB — HBA1C MFR BLD: 5.9 % (ref 4.8–5.6)

## 2018-02-27 ENCOUNTER — OFFICE VISIT (OUTPATIENT)
Dept: INTERNAL MEDICINE | Facility: CLINIC | Age: 58
End: 2018-02-27

## 2018-02-27 VITALS
DIASTOLIC BLOOD PRESSURE: 62 MMHG | RESPIRATION RATE: 18 BRPM | HEIGHT: 71 IN | HEART RATE: 62 BPM | SYSTOLIC BLOOD PRESSURE: 118 MMHG | BODY MASS INDEX: 22.99 KG/M2 | WEIGHT: 164.25 LBS

## 2018-02-27 DIAGNOSIS — J45.51 SEVERE PERSISTENT ASTHMA WITH ACUTE EXACERBATION: ICD-10-CM

## 2018-02-27 DIAGNOSIS — R73.01 IMPAIRED FASTING GLUCOSE: ICD-10-CM

## 2018-02-27 DIAGNOSIS — E78.00 PURE HYPERCHOLESTEROLEMIA: ICD-10-CM

## 2018-02-27 DIAGNOSIS — Z00.00 PE (PHYSICAL EXAM), ROUTINE: Primary | ICD-10-CM

## 2018-02-27 DIAGNOSIS — M85.89 OSTEOPENIA OF MULTIPLE SITES: ICD-10-CM

## 2018-02-27 DIAGNOSIS — N30.01 ACUTE CYSTITIS WITH HEMATURIA: ICD-10-CM

## 2018-02-27 DIAGNOSIS — E55.9 VITAMIN D DEFICIENCY: ICD-10-CM

## 2018-02-27 LAB
BILIRUB BLD-MCNC: NEGATIVE MG/DL
CLARITY, POC: ABNORMAL
COLOR UR: YELLOW
GLUCOSE UR STRIP-MCNC: NEGATIVE MG/DL
KETONES UR QL: NEGATIVE
LEUKOCYTE EST, POC: ABNORMAL
NITRITE UR-MCNC: NEGATIVE MG/ML
PH UR: 5 [PH] (ref 5–8)
PROT UR STRIP-MCNC: NEGATIVE MG/DL
RBC # UR STRIP: ABNORMAL /UL
SP GR UR: 1.01 (ref 1–1.03)
UROBILINOGEN UR QL: NORMAL

## 2018-02-27 PROCEDURE — 81003 URINALYSIS AUTO W/O SCOPE: CPT | Performed by: INTERNAL MEDICINE

## 2018-02-27 PROCEDURE — 93000 ELECTROCARDIOGRAM COMPLETE: CPT | Performed by: INTERNAL MEDICINE

## 2018-02-27 PROCEDURE — 99213 OFFICE O/P EST LOW 20 MIN: CPT | Performed by: INTERNAL MEDICINE

## 2018-02-27 PROCEDURE — 99396 PREV VISIT EST AGE 40-64: CPT | Performed by: INTERNAL MEDICINE

## 2018-02-27 RX ORDER — ATORVASTATIN CALCIUM 10 MG/1
10 TABLET, FILM COATED ORAL NIGHTLY
Qty: 90 TABLET | Refills: 3 | Status: SHIPPED | OUTPATIENT
Start: 2018-02-27 | End: 2019-02-28 | Stop reason: SDUPTHER

## 2018-02-27 RX ORDER — SULFAMETHOXAZOLE AND TRIMETHOPRIM 800; 160 MG/1; MG/1
1 TABLET ORAL 2 TIMES DAILY
Qty: 6 TABLET | Refills: 0 | Status: SHIPPED | OUTPATIENT
Start: 2018-02-27 | End: 2018-03-02

## 2018-02-27 NOTE — ASSESSMENT & PLAN NOTE
NEW DX with A1C 5.9; counseling re: physiology of dibaetes and glucose intolerance; encouraged reg phys activity to decr insulin resistance, moderation in unhealthy starches/sweets; f/u A1C in 6 mos

## 2018-02-27 NOTE — ASSESSMENT & PLAN NOTE
Lipids stable and at goal with atorvastatin 10 mg QHS #90, 3RF; goal LDL < 130, ideally < 100 (Patient LDL 74)
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
## 2018-02-27 NOTE — ASSESSMENT & PLAN NOTE
Lung exam back to baseline; continue Symbicort 80/4.52 puffs BID, gargle/spit after puffs - renew as needed
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
## 2018-08-22 ENCOUNTER — TRANSCRIBE ORDERS (OUTPATIENT)
Dept: INTERNAL MEDICINE | Facility: CLINIC | Age: 58
End: 2018-08-22

## 2018-08-22 DIAGNOSIS — Z12.31 VISIT FOR SCREENING MAMMOGRAM: Primary | ICD-10-CM

## 2018-08-26 NOTE — ASSESSMENT & PLAN NOTE
BG control stable with A1C 5.7; encouraged reg phys activity to decr insulin resistance, moderation in unhealthy starches/sweets; f/u A1C in 6 mos

## 2018-08-27 ENCOUNTER — OFFICE VISIT (OUTPATIENT)
Dept: INTERNAL MEDICINE | Facility: CLINIC | Age: 58
End: 2018-08-27

## 2018-08-27 VITALS
OXYGEN SATURATION: 99 % | SYSTOLIC BLOOD PRESSURE: 122 MMHG | HEART RATE: 49 BPM | DIASTOLIC BLOOD PRESSURE: 76 MMHG | BODY MASS INDEX: 23.66 KG/M2 | RESPIRATION RATE: 16 BRPM | WEIGHT: 169 LBS | HEIGHT: 71 IN

## 2018-08-27 DIAGNOSIS — R73.01 IMPAIRED FASTING GLUCOSE: Primary | ICD-10-CM

## 2018-08-27 DIAGNOSIS — Z71.85 VACCINE COUNSELING: ICD-10-CM

## 2018-08-27 DIAGNOSIS — J45.40 MODERATE PERSISTENT ASTHMA WITHOUT COMPLICATION: ICD-10-CM

## 2018-08-27 LAB
GLUCOSE BLDC GLUCOMTR-MCNC: 114 MG/DL (ref 70–130)
HBA1C MFR BLD: 5.7 %

## 2018-08-27 PROCEDURE — 99214 OFFICE O/P EST MOD 30 MIN: CPT | Performed by: INTERNAL MEDICINE

## 2018-08-27 PROCEDURE — 83036 HEMOGLOBIN GLYCOSYLATED A1C: CPT | Performed by: INTERNAL MEDICINE

## 2018-08-27 PROCEDURE — 82947 ASSAY GLUCOSE BLOOD QUANT: CPT | Performed by: INTERNAL MEDICINE

## 2018-08-27 RX ORDER — BUDESONIDE AND FORMOTEROL FUMARATE DIHYDRATE 80; 4.5 UG/1; UG/1
AEROSOL RESPIRATORY (INHALATION)
Qty: 1 INHALER | Refills: 1 | Status: SHIPPED | OUTPATIENT
Start: 2018-08-27 | End: 2019-03-01

## 2018-08-27 NOTE — ASSESSMENT & PLAN NOTE
Currently asymptomatic; recommend managing postnasal drainage; Rx for Symbicort 80/4.52 puffs BID #1, 2RF, noting importance of gargling and spitting and OV for further eval should sxs recur

## 2018-08-27 NOTE — PROGRESS NOTES
"Chief Complaint   Patient presents with   • 6 mo follow up     A1c; Impaired fasting glucose       History of Present Illness  58 y.o.  woman presents for sugar follow-up.  Notes breathing overall number but recently with some nasal congestion after school started.  Would like RF on symbicort just in case she goes into resp illness again in the fall.  Currently without cough or wheezing.    Review of Systems  Denies headaches, visual changes, CP, palpitations, SOB.  ROS (+) for nasal congestion attributed to allergies; denies sore throat, cough, wheezing, chest tightness. All other ROS reviewed and negative.    [facility] [place] [facility]  The following portions of the patient's history were reviewed and updated as appropriate: allergies, current medications, past family history, past medical history, past social history, past surgical history and problem list.    Current Outpatient Prescriptions:   •  albuterol (PROAIR HFA) 108 (90 Base) MCG/ACT inhaler,prn  •  atorvastatin (LIPITOR) 10 MG tablet, QHD  •  budesonide-formoterol (SYMBICORT) 80-4.5 MCG/ACT 2 puffs BID (not currently)  •  Cholecalciferol (VITAMIN D-3) 1000 UNITS QD  •  fluticasone (FLONASE) 50 MCG/ACT 2 sprays/nostril QD  •  Naproxen Sodium (ALEVE) 220 MG BID prn    VITALS:  /76   Pulse (!) 49   Resp 16   Ht 179.1 cm (70.5\")   Wt 76.7 kg (169 lb)   SpO2 99%   BMI 23.91 kg/m²     Physical Exam   Constitutional: She is oriented to person, place, and time. She appears well-developed and well-nourished.   Sounds mildly congested   Eyes: Conjunctivae and EOM are normal.   Cardiovascular: Normal rate, regular rhythm and normal heart sounds.    Pulmonary/Chest: Effort normal and breath sounds normal. No respiratory distress. She has no wheezes. She exhibits no tenderness.   Abdominal: Soft. Bowel sounds are normal.   Neurological: She is alert and oriented to person, place, and time.   Skin: Skin is warm and dry.   Psychiatric: She has a normal mood and " affect. Her behavior is normal.   Nursing note and vitals reviewed.      LABS  Results for orders placed or performed in visit on 08/27/18   POC Glycosylated Hemoglobin (Hb A1C)   Result Value Ref Range    Hemoglobin A1C 5.7 %   POCT Glucose   Result Value Ref Range    Glucose 114 70 - 130 mg/dL     2/18 A1C 5.9    ASSESSMENT/PLAN  Problem List Items Addressed This Visit     Mild persistent asthma without complication     Currently asymptomatic; recommend managing postnasal drainage; Rx for Symbicort 80/4.52 puffs BID #1, 2RF, noting importance of gargling and spitting and OV for further eval should sxs recur           Relevant Medications    budesonide-formoterol (SYMBICORT) 80-4.5 MCG/ACT inhaler    Impaired fasting glucose - Primary     BG control stable with A1C 5.7; encouraged reg phys activity to decr insulin resistance, moderation in unhealthy starches/sweets; f/u A1C in 6 mos           Relevant Orders    POC Glycosylated Hemoglobin (Hb A1C) (Completed)    POCT Glucose (Completed)      Other Visit Diagnoses     Vaccine counseling        counseling re: shingrix, incl risks, side effects and rec to get at pharmacy; also rec flu vacc upcoming mos - she states will do in 10/18          FOLLOW-UP  1. Health maintenance - counseling re: shingrix; patient agreeable to flu vacc this season; mammo pending 9/21/18 per patient  2. RTC 3/1/19 for PE/Pap; fasting labs wk prior to appt (CBC, CMP, TSH, lipids, UA/micr, A1C, vit D, CPK)    Electronically signed by:    Alia Limon MD  08/27/2018

## 2018-09-21 ENCOUNTER — OFFICE VISIT (OUTPATIENT)
Dept: INTERNAL MEDICINE | Facility: CLINIC | Age: 58
End: 2018-09-21

## 2018-09-21 VITALS
BODY MASS INDEX: 23.38 KG/M2 | HEART RATE: 65 BPM | WEIGHT: 167 LBS | HEIGHT: 71 IN | DIASTOLIC BLOOD PRESSURE: 62 MMHG | RESPIRATION RATE: 12 BRPM | SYSTOLIC BLOOD PRESSURE: 130 MMHG | OXYGEN SATURATION: 96 % | TEMPERATURE: 98.3 F

## 2018-09-21 DIAGNOSIS — J01.10 ACUTE NON-RECURRENT FRONTAL SINUSITIS: Primary | ICD-10-CM

## 2018-09-21 DIAGNOSIS — J02.9 SORE THROAT: ICD-10-CM

## 2018-09-21 LAB
EXPIRATION DATE: NORMAL
INTERNAL CONTROL: NORMAL
Lab: NORMAL
S PYO AG THROAT QL: NEGATIVE

## 2018-09-21 PROCEDURE — 99213 OFFICE O/P EST LOW 20 MIN: CPT | Performed by: INTERNAL MEDICINE

## 2018-09-21 PROCEDURE — 87880 STREP A ASSAY W/OPTIC: CPT | Performed by: INTERNAL MEDICINE

## 2018-09-21 RX ORDER — METHYLPREDNISOLONE 4 MG/1
TABLET ORAL
Qty: 21 TABLET | Refills: 0 | Status: SHIPPED | OUTPATIENT
Start: 2018-09-21 | End: 2018-09-27

## 2018-09-21 RX ORDER — MOMETASONE FUROATE 50 UG/1
2 SPRAY, METERED NASAL DAILY
COMMUNITY
End: 2020-03-09

## 2018-09-21 RX ORDER — AZITHROMYCIN 250 MG/1
TABLET, FILM COATED ORAL
Qty: 6 TABLET | Refills: 0 | Status: SHIPPED | OUTPATIENT
Start: 2018-09-21 | End: 2018-09-26

## 2018-09-21 NOTE — PROGRESS NOTES
"Chief Complaint   Patient presents with   • Sinusitis   • Fever     Low Grade   • Sore Throat       History of Present Illness  58 y.o.  woman presents for further eval of cold sxs.  HPI started about 1 week ago with forehead pain with sinus congestion. She reports throbbing headache when bending over.   Tried decongestant and expectorant, trying to decr risk of infection going in the chest.    2d ago, developed fever and chills, and temperature was 100.7 this morning.  Has taken Tylenol for the temperature which his why temp is down at this time.  Has felt drainage down her throat in the middle the night with a tickle in the chest.  Currently taking Benadryl, Mucinex, Nasonex, and Tylenol.  Has also been using some cough syrup with expectorant during the daytime 3 times a day.  Minimal cough, reporting that any cough she has is dry.  Also utilizing netipot at this time.    Review of Systems  ROS (+) for sinus and head pressure, specifically pointing to the area over the medial left eyebrow.  Also with minimal dry cough, drainage into the throat, chills and low-grade fevers.  Denies any ear symptoms.  Denies shortness of breath or wheezing.  All other ROS reviewed and negative.    [facility]  The following portions of the patient's history were reviewed and updated as appropriate: allergies, current medications, past family history, past medical history, past social history, past surgical history and problem list.    Current Outpatient Prescriptions:   •  albuterol (PROAIR HFA) 108 (90 Base) MCG/ACT inhaler,prn  •  atorvastatin (LIPITOR) 10 MG tablet, QHS  •  budesonide-formoterol (SYMBICORT) 80-4.5 MCG/ACT inhaler, 2 puffs BID  •  Cholecalciferol (VITAMIN D-3) 1000 UNITS capsule, QD  •  nasonex 2 sprays into each nostril QD  •  Naproxen Sodium (ALEVE) 220 MG capsule,BID prn    VITALS:  /62   Pulse 65   Temp 98.3 °F (36.8 °C) (Temporal Artery )   Resp 12   Ht 179.1 cm (70.5\")   Wt 75.8 kg (167 lb)   " SpO2 96%   BMI 23.62 kg/m²     Physical Exam   Constitutional: She is oriented to person, place, and time. She appears well-developed and well-nourished.   HENT:   Right Ear: External ear normal.   Mouth/Throat: Oropharynx is clear and moist. No oropharyngeal exudate.   Nasal mucosa pale without significant swelling; left frontal tenderness with palpation, minimally at the maxillary sinuses bilaterally; left ear chronically closed; posterior drainage the oropharynx without erythema, exudate, or swelling   Eyes: Conjunctivae and EOM are normal.   Neck: Normal range of motion. Neck supple.   Cardiovascular: Normal rate, regular rhythm and normal heart sounds.    Pulmonary/Chest: Effort normal and breath sounds normal. No respiratory distress. She has no wheezes. She has no rales. She exhibits no tenderness.   Abdominal: Soft. Bowel sounds are normal.   Lymphadenopathy:     She has no cervical adenopathy.   Neurological: She is alert and oriented to person, place, and time.   Skin: Skin is warm and dry.   Psychiatric: She has a normal mood and affect. Her behavior is normal.   Nursing note and vitals reviewed.      LABS  Results for orders placed or performed in visit on 09/21/18   POCT rapid strep A   Result Value Ref Range    Rapid Strep A Screen Negative Negative, VALID, INVALID, Not Performed    Internal Control Passed Passed    Lot Number [id number]     Expiration Date 1,312,020        ASSESSMENT/PLAN  Problem List Items Addressed This Visit     None      Visit Diagnoses     Acute non-recurrent frontal sinusitis    -  Primary    cont nasonex, netipot, and change benadryl to 24h antihist; RX medrol #1, 0RF; with upcoming wkend, if worsens, has RX for zpak    Relevant Medications    MethylPREDNISolone (MEDROL, ARMINDA,) 4 MG tablet    azithromycin (ZITHROMAX) 250 MG tablet    Sore throat        secondary to PND; change to 24h antihistamine, lozenges as needed; neg for strep    Relevant Orders    POCT rapid strep A  (Completed)          FOLLOW-UP  1. Health maintenance - flu vacc already done 9/16/18  2. RTC 3/1/19 as scheduled for PE/Pap; fasting labs the week prior to appt (CBC, CMP, TSH, lipids, UA/micro, A1C, vit D, CPK)      Electronically signed by:    Alia Limon MD  09/21/2018

## 2018-09-28 ENCOUNTER — HOSPITAL ENCOUNTER (OUTPATIENT)
Dept: MAMMOGRAPHY | Facility: HOSPITAL | Age: 58
Discharge: HOME | End: 2018-09-28
Attending: INTERNAL MEDICINE | Admitting: INTERNAL MEDICINE

## 2018-09-28 DIAGNOSIS — Z12.31 VISIT FOR SCREENING MAMMOGRAM: ICD-10-CM

## 2018-09-28 PROCEDURE — 77063 BREAST TOMOSYNTHESIS BI: CPT | Performed by: RADIOLOGY

## 2018-09-28 PROCEDURE — 77063 BREAST TOMOSYNTHESIS BI: CPT

## 2018-09-28 PROCEDURE — 77067 SCR MAMMO BI INCL CAD: CPT | Performed by: RADIOLOGY

## 2018-09-28 PROCEDURE — 77067 SCR MAMMO BI INCL CAD: CPT

## 2019-02-11 ENCOUNTER — TELEPHONE (OUTPATIENT)
Dept: INTERNAL MEDICINE | Facility: CLINIC | Age: 59
End: 2019-02-11

## 2019-02-11 DIAGNOSIS — E55.9 VITAMIN D DEFICIENCY: ICD-10-CM

## 2019-02-11 DIAGNOSIS — Z00.00 PE (PHYSICAL EXAM), ROUTINE: Primary | ICD-10-CM

## 2019-02-11 DIAGNOSIS — E78.00 PURE HYPERCHOLESTEROLEMIA: ICD-10-CM

## 2019-02-11 DIAGNOSIS — E06.1 SUBACUTE THYROIDITIS: ICD-10-CM

## 2019-02-11 DIAGNOSIS — R73.01 IMPAIRED FASTING GLUCOSE: ICD-10-CM

## 2019-02-22 ENCOUNTER — LAB (OUTPATIENT)
Dept: INTERNAL MEDICINE | Facility: CLINIC | Age: 59
End: 2019-02-22

## 2019-02-22 DIAGNOSIS — Z00.00 PE (PHYSICAL EXAM), ROUTINE: ICD-10-CM

## 2019-02-22 DIAGNOSIS — R73.01 IMPAIRED FASTING GLUCOSE: ICD-10-CM

## 2019-02-22 DIAGNOSIS — E55.9 VITAMIN D DEFICIENCY: ICD-10-CM

## 2019-02-22 DIAGNOSIS — E78.00 PURE HYPERCHOLESTEROLEMIA: ICD-10-CM

## 2019-02-22 DIAGNOSIS — E06.1 SUBACUTE THYROIDITIS: ICD-10-CM

## 2019-02-22 LAB
25(OH)D3 SERPL-MCNC: 55.1 NG/ML
ALBUMIN SERPL-MCNC: 4.57 G/DL (ref 3.2–4.8)
ALBUMIN/GLOB SERPL: 1.7 G/DL (ref 1.5–2.5)
ALP SERPL-CCNC: 78 U/L (ref 25–100)
ALT SERPL W P-5'-P-CCNC: 29 U/L (ref 7–40)
ANION GAP SERPL CALCULATED.3IONS-SCNC: 8 MMOL/L (ref 3–11)
ARTICHOKE IGE QN: 96 MG/DL (ref 0–130)
AST SERPL-CCNC: 26 U/L (ref 0–33)
BACTERIA UR QL AUTO: NORMAL /HPF
BASOPHILS # BLD AUTO: 0.05 10*3/MM3 (ref 0–0.2)
BASOPHILS NFR BLD AUTO: 0.5 % (ref 0–1)
BILIRUB SERPL-MCNC: 0.4 MG/DL (ref 0.3–1.2)
BILIRUB UR QL STRIP: NEGATIVE
BUN BLD-MCNC: 16 MG/DL (ref 9–23)
BUN/CREAT SERPL: 20.8 (ref 7–25)
CALCIUM SPEC-SCNC: 9.7 MG/DL (ref 8.7–10.4)
CHLORIDE SERPL-SCNC: 99 MMOL/L (ref 99–109)
CHOLEST SERPL-MCNC: 186 MG/DL (ref 0–200)
CK SERPL-CCNC: 88 U/L (ref 26–174)
CLARITY UR: CLEAR
CO2 SERPL-SCNC: 29 MMOL/L (ref 20–31)
COLOR UR: ABNORMAL
CREAT BLD-MCNC: 0.77 MG/DL (ref 0.6–1.3)
DEPRECATED RDW RBC AUTO: 45.7 FL (ref 37–54)
EOSINOPHIL # BLD AUTO: 0.66 10*3/MM3 (ref 0–0.3)
EOSINOPHIL NFR BLD AUTO: 7.2 % (ref 0–3)
ERYTHROCYTE [DISTWIDTH] IN BLOOD BY AUTOMATED COUNT: 13.9 % (ref 11.3–14.5)
GFR SERPL CREATININE-BSD FRML MDRD: 76 ML/MIN/1.73
GLOBULIN UR ELPH-MCNC: 2.6 GM/DL
GLUCOSE BLD-MCNC: 84 MG/DL (ref 70–100)
GLUCOSE UR STRIP-MCNC: NEGATIVE MG/DL
HBA1C MFR BLD: 5.9 % (ref 4.8–5.6)
HCT VFR BLD AUTO: 42.3 % (ref 34.5–44)
HDLC SERPL-MCNC: 61 MG/DL (ref 40–60)
HGB BLD-MCNC: 13.3 G/DL (ref 11.5–15.5)
HGB UR QL STRIP.AUTO: NEGATIVE
HYALINE CASTS UR QL AUTO: NORMAL /LPF
IMM GRANULOCYTES # BLD AUTO: 0.09 10*3/MM3 (ref 0–0.05)
IMM GRANULOCYTES NFR BLD AUTO: 1 % (ref 0–0.6)
KETONES UR QL STRIP: NEGATIVE
LEUKOCYTE ESTERASE UR QL STRIP.AUTO: NEGATIVE
LYMPHOCYTES # BLD AUTO: 2.27 10*3/MM3 (ref 0.6–4.8)
LYMPHOCYTES NFR BLD AUTO: 24.8 % (ref 24–44)
MCH RBC QN AUTO: 28.5 PG (ref 27–31)
MCHC RBC AUTO-ENTMCNC: 31.4 G/DL (ref 32–36)
MCV RBC AUTO: 90.8 FL (ref 80–99)
MONOCYTES # BLD AUTO: 0.59 10*3/MM3 (ref 0–1)
MONOCYTES NFR BLD AUTO: 6.4 % (ref 0–12)
NEUTROPHILS # BLD AUTO: 5.6 10*3/MM3 (ref 1.5–8.3)
NEUTROPHILS NFR BLD AUTO: 61.1 % (ref 41–71)
NITRITE UR QL STRIP: NEGATIVE
PH UR STRIP.AUTO: 5.5 [PH] (ref 5–8)
PLATELET # BLD AUTO: 500 10*3/MM3 (ref 150–450)
PMV BLD AUTO: 9.6 FL (ref 6–12)
POTASSIUM BLD-SCNC: 4.4 MMOL/L (ref 3.5–5.5)
PROT SERPL-MCNC: 7.2 G/DL (ref 5.7–8.2)
PROT UR QL STRIP: NEGATIVE
RBC # BLD AUTO: 4.66 10*6/MM3 (ref 3.89–5.14)
RBC # UR: NORMAL /HPF
REF LAB TEST METHOD: NORMAL
SODIUM BLD-SCNC: 136 MMOL/L (ref 132–146)
SP GR UR STRIP: 1.02 (ref 1–1.03)
SQUAMOUS #/AREA URNS HPF: NORMAL /HPF
TRIGL SERPL-MCNC: 92 MG/DL (ref 0–150)
TSH SERPL DL<=0.05 MIU/L-ACNC: 2.32 MIU/ML (ref 0.35–5.35)
UROBILINOGEN UR QL STRIP: ABNORMAL
WBC NRBC COR # BLD: 9.17 10*3/MM3 (ref 3.5–10.8)
WBC UR QL AUTO: NORMAL /HPF

## 2019-02-22 PROCEDURE — 80061 LIPID PANEL: CPT | Performed by: INTERNAL MEDICINE

## 2019-02-22 PROCEDURE — 82306 VITAMIN D 25 HYDROXY: CPT | Performed by: INTERNAL MEDICINE

## 2019-02-22 PROCEDURE — 83036 HEMOGLOBIN GLYCOSYLATED A1C: CPT | Performed by: INTERNAL MEDICINE

## 2019-02-22 PROCEDURE — 85025 COMPLETE CBC W/AUTO DIFF WBC: CPT | Performed by: INTERNAL MEDICINE

## 2019-02-22 PROCEDURE — 82550 ASSAY OF CK (CPK): CPT | Performed by: INTERNAL MEDICINE

## 2019-02-22 PROCEDURE — 80053 COMPREHEN METABOLIC PANEL: CPT | Performed by: INTERNAL MEDICINE

## 2019-02-22 PROCEDURE — 84443 ASSAY THYROID STIM HORMONE: CPT | Performed by: INTERNAL MEDICINE

## 2019-02-22 PROCEDURE — 81001 URINALYSIS AUTO W/SCOPE: CPT | Performed by: INTERNAL MEDICINE

## 2019-03-01 ENCOUNTER — OFFICE VISIT (OUTPATIENT)
Dept: INTERNAL MEDICINE | Facility: CLINIC | Age: 59
End: 2019-03-01

## 2019-03-01 VITALS
HEART RATE: 48 BPM | WEIGHT: 164 LBS | SYSTOLIC BLOOD PRESSURE: 118 MMHG | OXYGEN SATURATION: 97 % | DIASTOLIC BLOOD PRESSURE: 64 MMHG | HEIGHT: 71 IN | BODY MASS INDEX: 22.96 KG/M2

## 2019-03-01 DIAGNOSIS — R73.01 IMPAIRED FASTING GLUCOSE: ICD-10-CM

## 2019-03-01 DIAGNOSIS — E55.9 VITAMIN D DEFICIENCY: ICD-10-CM

## 2019-03-01 DIAGNOSIS — E78.00 PURE HYPERCHOLESTEROLEMIA: ICD-10-CM

## 2019-03-01 DIAGNOSIS — Z78.0 MENOPAUSE: ICD-10-CM

## 2019-03-01 DIAGNOSIS — J45.30 MILD PERSISTENT ASTHMA WITHOUT COMPLICATION: ICD-10-CM

## 2019-03-01 DIAGNOSIS — Z00.00 PE (PHYSICAL EXAM), ROUTINE: Primary | ICD-10-CM

## 2019-03-01 DIAGNOSIS — M85.89 OSTEOPENIA OF MULTIPLE SITES: ICD-10-CM

## 2019-03-01 PROCEDURE — 99396 PREV VISIT EST AGE 40-64: CPT | Performed by: INTERNAL MEDICINE

## 2019-03-01 PROCEDURE — 93000 ELECTROCARDIOGRAM COMPLETE: CPT | Performed by: INTERNAL MEDICINE

## 2019-03-01 RX ORDER — ATORVASTATIN CALCIUM 10 MG/1
10 TABLET, FILM COATED ORAL NIGHTLY
Qty: 90 TABLET | Refills: 3 | Status: SHIPPED | OUTPATIENT
Start: 2019-03-01 | End: 2020-02-29 | Stop reason: SDUPTHER

## 2019-03-01 RX ORDER — ALBUTEROL SULFATE 90 UG/1
2 AEROSOL, METERED RESPIRATORY (INHALATION) EVERY 6 HOURS PRN
Qty: 1 INHALER | Refills: 1 | Status: SHIPPED | OUTPATIENT
Start: 2019-03-01 | End: 2019-05-24 | Stop reason: SDUPTHER

## 2019-03-01 NOTE — ASSESSMENT & PLAN NOTE
Vit D level stable 55.1 on 1000 units QD; probably ok to forego supplementation in the summer months

## 2019-03-01 NOTE — PROGRESS NOTES
"Chief Complaint   Patient presents with   • Annual Exam   • Gynecologic Exam       History of Present Illness  58 y.o.  woman presents for updated physical examination.  Has had hep A vaccines previously in the past with travel.  Actually got her flu shot this season.    Review of Systems  Denies headaches, visual changes, CP, palpitations, SOB, cough, abd pain, n/v/d, difficulty with urination, numbness/tingling, falls, mood changes, lightheadedness, hearing changes, rashes.    Denies vaginal discharge or bleeding (no periods) or breast concerns.   All other ROS reviewed and negative.    [facility]  The following portions of the patient's history were reviewed and updated as appropriate: allergies, current medications, past family history, past medical history, past social history, past surgical history and problem list.      Current Outpatient Medications:   •  Cholecalciferol (VITAMIN D-3) 1000 UNITS QD  •  mometasone (NASONEX) 50 MCG/ACT 2 spr/nostril QD  •  Naproxen Sodium (ALEVE) 220 MG BID prn  •  albuterol sulfate HFA (PROAIR HFA) 108 (90 Base) MCG/ACT inhaler, prn  •  atorvastatin (LIPITOR) 10 MG tablet, QD      VITALS:  /64   Pulse (!) 48   Ht 179.1 cm (70.5\")   Wt 74.4 kg (164 lb)   SpO2 97%   BMI 23.20 kg/m²     Physical Exam   Constitutional: She is oriented to person, place, and time. She appears well-developed and well-nourished.   HENT:   Head: Normocephalic.   Right Ear: External ear normal.   Nose: Nose normal.   Mouth/Throat: Oropharynx is clear and moist and mucous membranes are normal. No oropharyngeal exudate.   Chronic deformity left ear, pinpoint opening, absent hearing   Eyes: Conjunctivae and EOM are normal. Pupils are equal, round, and reactive to light.   Neck: Normal range of motion. Neck supple. Carotid bruit is not present (bilaterally). No thyromegaly present.   Cardiovascular: Regular rhythm and normal heart sounds. Bradycardia present.   Pulmonary/Chest: Effort normal " and breath sounds normal. No respiratory distress. She has no wheezes. She has no rales.   Abdominal: Soft. Bowel sounds are normal. She exhibits no distension and no mass. There is no hepatosplenomegaly. There is no tenderness.   Genitourinary:   Genitourinary Comments: Breast exam unremarkable without masses, skin changes, nipple discharge, or axillary adenopathy.    Pelvic exam performed with normal external genitalia; normal urethral meatus and urethral exam; atrophic vagina mucosa, no discharge in the vaginal vault; no masses palpated/seen and no CMT or adnexal tenderness with palpation; no bladder abnormality noted; perineum intact.     Musculoskeletal: Normal range of motion. She exhibits no edema.   Lymphadenopathy:     She has no cervical adenopathy.   Neurological: She is alert and oriented to person, place, and time. She has normal reflexes. She displays normal reflexes. No cranial nerve deficit.   Skin: Skin is warm and dry. No rash noted.   Psychiatric: She has a normal mood and affect. Her behavior is normal.   Nursing note and vitals reviewed.      LABS  Results for orders placed or performed in visit on 02/22/19   Comprehensive Metabolic Panel   Result Value Ref Range    Glucose 84 70 - 100 mg/dL    BUN 16 9 - 23 mg/dL    Creatinine 0.77 0.60 - 1.30 mg/dL    Sodium 136 132 - 146 mmol/L    Potassium 4.4 3.5 - 5.5 mmol/L    Chloride 99 99 - 109 mmol/L    CO2 29.0 20.0 - 31.0 mmol/L    Calcium 9.7 8.7 - 10.4 mg/dL    Total Protein 7.2 5.7 - 8.2 g/dL    Albumin 4.57 3.20 - 4.80 g/dL    ALT (SGPT) 29 7 - 40 U/L    AST (SGOT) 26 0 - 33 U/L    Alkaline Phosphatase 78 25 - 100 U/L    Total Bilirubin 0.4 0.3 - 1.2 mg/dL    eGFR Non African Amer 76 >60 mL/min/1.73    Globulin 2.6 gm/dL    A/G Ratio 1.7 1.5 - 2.5 g/dL    BUN/Creatinine Ratio 20.8 7.0 - 25.0    Anion Gap 8.0 3.0 - 11.0 mmol/L   Lipid Panel   Result Value Ref Range    Total Cholesterol 186 0 - 200 mg/dL    Triglycerides 92 0 - 150 mg/dL    HDL  Cholesterol 61 (H) 40 - 60 mg/dL    LDL Cholesterol  96 0 - 130 mg/dL   TSH   Result Value Ref Range    TSH 2.324 0.350 - 5.350 mIU/mL   Hemoglobin A1c   Result Value Ref Range    Hemoglobin A1C 5.90 (H) 4.80 - 5.60 %   Vitamin D 25 Hydroxy   Result Value Ref Range    25 Hydroxy, Vitamin D 55.1 ng/ml   CK   Result Value Ref Range    Creatine Kinase 88 26 - 174 U/L   CBC Auto Differential   Result Value Ref Range    WBC 9.17 3.50 - 10.80 10*3/mm3    RBC 4.66 3.89 - 5.14 10*6/mm3    Hemoglobin 13.3 11.5 - 15.5 g/dL    Hematocrit 42.3 34.5 - 44.0 %    MCV 90.8 80.0 - 99.0 fL    MCH 28.5 27.0 - 31.0 pg    MCHC 31.4 (L) 32.0 - 36.0 g/dL    RDW 13.9 11.3 - 14.5 %    RDW-SD 45.7 37.0 - 54.0 fl    MPV 9.6 6.0 - 12.0 fL    Platelets 500 (H) 150 - 450 10*3/mm3    Neutrophil % 61.1 41.0 - 71.0 %    Lymphocyte % 24.8 24.0 - 44.0 %    Monocyte % 6.4 0.0 - 12.0 %    Eosinophil % 7.2 (H) 0.0 - 3.0 %    Basophil % 0.5 0.0 - 1.0 %    Immature Grans % 1.0 (H) 0.0 - 0.6 %    Neutrophils, Absolute 5.60 1.50 - 8.30 10*3/mm3    Lymphocytes, Absolute 2.27 0.60 - 4.80 10*3/mm3    Monocytes, Absolute 0.59 0.00 - 1.00 10*3/mm3    Eosinophils, Absolute 0.66 (H) 0.00 - 0.30 10*3/mm3    Basophils, Absolute 0.05 0.00 - 0.20 10*3/mm3    Immature Grans, Absolute 0.09 (H) 0.00 - 0.05 10*3/mm3   Urinalysis without microscopic (no culture) - Urine, Clean Catch   Result Value Ref Range    Color, UA Dark Yellow (A) Yellow, Straw    Appearance, UA Clear Clear    pH, UA 5.5 5.0 - 8.0    Specific Gravity, UA 1.018 1.001 - 1.030    Glucose, UA Negative Negative    Ketones, UA Negative Negative    Bilirubin, UA Negative Negative    Blood, UA Negative Negative    Protein, UA Negative Negative    Leuk Esterase, UA Negative Negative    Nitrite, UA Negative Negative    Urobilinogen, UA 0.2 E.U./dL 0.2 - 1.0 E.U./dL   Urinalysis, Microscopic Only - Urine, Clean Catch   Result Value Ref Range    RBC, UA None Seen None Seen, 0-2 /HPF    WBC, UA 0-2 None Seen, 0-2  /HPF    Bacteria, UA None Seen None Seen, Trace /HPF    Squamous Epithelial Cells, UA 0-2 None Seen, 0-2 /HPF    Hyaline Casts, UA 0-6 0 - 6 /LPF    Methodology Automated Microscopy        ECG 12 Lead  Date/Time: 3/1/2019 4:20 PM  Performed by: Alia Limon MD  Authorized by: Alia Limon MD   Comparison: compared with previous ECG from 2/27/2018  Similar to previous ECG  Comparison to previous ECG: No further inverted T in V2  Rhythm: sinus rhythm and sinus bradycardia  Rate: normal  BPM: 52  Conduction: conduction normal  ST Segments: ST segments normal  T Waves: T waves normal  QRS axis: normal  Clinical impression comment: stable EKG              ASSESSMENT/PLAN  Problem List Items Addressed This Visit     Mild persistent asthma without complication    Relevant Medications    albuterol sulfate HFA (PROAIR HFA) 108 (90 Base) MCG/ACT inhaler    Hyperlipidemia     Lipids stable, at goal, on atorvastatin 10mg QHS #90, 3RF         Relevant Medications    atorvastatin (LIPITOR) 10 MG tablet    Other Relevant Orders    ECG 12 Lead    Osteopenia     Calcium and vitamin D supplementation with weight-bearing exercise; DEXA ordered            Vitamin D deficiency     Vit D level stable 55.1 on 1000 units QD; probably ok to forego supplementation in the summer months         PE (physical exam), routine - Primary     Health maintenance - flu 9/18; Tdap 10/11; hep A completed previously; rec Shingrix; mammo 10/18; breast/pelvic with Pap performed today; DEXA ordered (last 3/17); colonosc 7/25/17, repeat 2027 per Dr. Stewart; eye exam pending; dental exam UTD q 6 mos; (+) seat belt use         Impaired fasting glucose     BG control stable with A1C 5.9; encouraged reg phys activity to decr insulin resistance, moderation in unhealthy starches/sweets; f/u A1C in 6 mos                 FOLLOW-UP  RTC 6 mos with A1C    Electronically signed by:    Alia Limon MD  03/01/2019

## 2019-03-01 NOTE — ASSESSMENT & PLAN NOTE
Health maintenance - flu 9/18; Tdap 10/11; hep A completed previously; rec Shingrix; mammo 10/18; breast/pelvic with Pap performed today; DEXA ordered (last 3/17); colonosc 7/25/17, repeat 2027 per Dr. Stewart; eye exam pending; dental exam UTD q 6 mos; (+) seat belt use

## 2019-03-08 ENCOUNTER — TELEPHONE (OUTPATIENT)
Dept: INTERNAL MEDICINE | Facility: CLINIC | Age: 59
End: 2019-03-08

## 2019-03-08 NOTE — TELEPHONE ENCOUNTER
----- Message from Alia Limon MD sent at 3/7/2019  3:59 PM EST -----  Regarding: Pap results  Pap is normal

## 2019-04-05 ENCOUNTER — HOSPITAL ENCOUNTER (OUTPATIENT)
Dept: BONE DENSITY | Facility: HOSPITAL | Age: 59
Discharge: HOME | End: 2019-04-05
Admitting: INTERNAL MEDICINE

## 2019-04-05 DIAGNOSIS — Z78.0 MENOPAUSE: ICD-10-CM

## 2019-04-05 PROCEDURE — 77080 DXA BONE DENSITY AXIAL: CPT

## 2019-04-08 NOTE — PROGRESS NOTES
Dear Rocio,    Thank you for obtaining your DEXA (bone density) scan.  I have received those results and would like to review them with you.      Your bone density remains in the osteopenic range.  This is between normal and osteoporosis and mildly decreased as compared to your last DEXA in 2017.      The values indicate that your risk of a major fracture in the next 10 years is 7.9%.    Please maintain regular calcium and vitamin D supplementation.  Calcium intake should be 1000mg per day between diet and supplements.  Weight bearing exercise is good for bone health as well.    We should plan to repeat your DEXA in 2-3 years.  Please let me know if you have any questions or concerns regarding these results.        Sincerely,  Alia Limon MD

## 2019-05-14 ENCOUNTER — TELEPHONE (OUTPATIENT)
Dept: INTERNAL MEDICINE | Facility: CLINIC | Age: 59
End: 2019-05-14

## 2019-05-14 NOTE — TELEPHONE ENCOUNTER
PT IS HAVING SYMPTOMS OF ASTHMATIC BRONCHITIS, JUST LIKE LAST YEAR.  PT IS WONDERING IF DR JOHNSON CAN CALL IN [place] FOR HER.    PLEASE CALL PT IF SHE NEEDS TO BE SEEN BEFORE REFILL.

## 2019-05-24 ENCOUNTER — OFFICE VISIT (OUTPATIENT)
Dept: INTERNAL MEDICINE | Facility: CLINIC | Age: 59
End: 2019-05-24

## 2019-05-24 VITALS
SYSTOLIC BLOOD PRESSURE: 120 MMHG | TEMPERATURE: 99 F | HEIGHT: 71 IN | BODY MASS INDEX: 23.38 KG/M2 | HEART RATE: 65 BPM | DIASTOLIC BLOOD PRESSURE: 76 MMHG | OXYGEN SATURATION: 95 % | WEIGHT: 167 LBS

## 2019-05-24 DIAGNOSIS — J45.30 MILD PERSISTENT ASTHMA WITHOUT COMPLICATION: ICD-10-CM

## 2019-05-24 DIAGNOSIS — J06.9 ACUTE URI: ICD-10-CM

## 2019-05-24 DIAGNOSIS — J40 BRONCHITIS: Primary | ICD-10-CM

## 2019-05-24 RX ORDER — BENZONATATE 200 MG/1
200 CAPSULE ORAL 3 TIMES DAILY PRN
Qty: 20 CAPSULE | Refills: 0 | Status: SHIPPED | OUTPATIENT
Start: 2019-05-24 | End: 2019-05-31

## 2019-05-24 RX ORDER — DOXYCYCLINE 100 MG/1
100 CAPSULE ORAL 2 TIMES DAILY
Qty: 20 CAPSULE | Refills: 0 | Status: SHIPPED | OUTPATIENT
Start: 2019-05-24 | End: 2019-05-31

## 2019-05-24 RX ORDER — ALBUTEROL SULFATE 90 UG/1
2 AEROSOL, METERED RESPIRATORY (INHALATION) EVERY 6 HOURS PRN
Qty: 1 INHALER | Refills: 1 | Status: SHIPPED | OUTPATIENT
Start: 2019-05-24 | End: 2020-01-14 | Stop reason: SDUPTHER

## 2019-05-24 RX ORDER — BUDESONIDE AND FORMOTEROL FUMARATE DIHYDRATE 80; 4.5 UG/1; UG/1
2 AEROSOL RESPIRATORY (INHALATION)
Qty: 10.2 G | Refills: 1 | Status: SHIPPED | OUTPATIENT
Start: 2019-05-24 | End: 2020-01-14 | Stop reason: SDUPTHER

## 2019-05-24 RX ORDER — PREDNISONE 10 MG/1
TABLET ORAL
Qty: 21 TABLET | Refills: 0 | Status: SHIPPED | OUTPATIENT
Start: 2019-05-24 | End: 2019-05-30

## 2019-05-24 NOTE — PROGRESS NOTES
Subjective   Memo Guajardo is a 58 y.o. female.     URI    This is a new problem. The current episode started 1 to 4 weeks ago. The problem has been gradually worsening. The maximum temperature recorded prior to her arrival was 100.4 - 100.9 F. The fever has been present for 1 to 2 days. Associated symptoms include congestion, coughing, headaches, rhinorrhea and wheezing. Pertinent negatives include no chest pain or rash. She has tried decongestant, increased fluids and inhaler use for the symptoms. The treatment provided mild relief.           Review of Systems   Constitutional: Positive for fever. Negative for chills.   HENT: Positive for congestion, rhinorrhea and sinus pressure.    Respiratory: Positive for cough, chest tightness and wheezing.    Cardiovascular: Negative for chest pain.   Skin: Negative for rash.   Allergic/Immunologic: Negative for environmental allergies and immunocompromised state.   Neurological: Positive for headaches.   Hematological: Negative for adenopathy.       Objective   Physical Exam   Constitutional: She is oriented to person, place, and time. She appears well-developed and well-nourished. No distress.   HENT:   Head: Normocephalic and atraumatic.   Nose: Mucosal edema and rhinorrhea present. Right sinus exhibits no maxillary sinus tenderness and no frontal sinus tenderness. Left sinus exhibits no maxillary sinus tenderness and no frontal sinus tenderness.   Eyes: EOM are normal. Pupils are equal, round, and reactive to light.   Neck: Normal range of motion. Neck supple.   Cardiovascular: Normal rate, regular rhythm and normal heart sounds. Exam reveals no gallop and no friction rub.   No murmur heard.  Pulmonary/Chest: Effort normal. She has wheezes in the right upper field, the right middle field, the right lower field, the left upper field and the left lower field.   Tight cough   Musculoskeletal: Normal range of motion.   Lymphadenopathy:     She has no cervical adenopathy.    Neurological: She is alert and oriented to person, place, and time.   Skin: Skin is warm and dry. Capillary refill takes less than 2 seconds. No rash noted. She is not diaphoretic.   Psychiatric: She has a normal mood and affect. Her behavior is normal. Judgment and thought content normal.   Nursing note and vitals reviewed.      Assessment/Plan   Memo was seen today for cough, fever, headache and med refill.    Diagnoses and all orders for this visit:    Bronchitis  -     doxycycline (MONODOX) 100 MG capsule; Take 1 capsule by mouth 2 (Two) Times a Day.  -     budesonide-formoterol (SYMBICORT) 80-4.5 MCG/ACT inhaler; Inhale 2 puffs 2 (Two) Times a Day.  -     benzonatate (TESSALON) 200 MG capsule; Take 1 capsule by mouth 3 (Three) Times a Day As Needed for Cough.  -     predniSONE (DELTASONE) 10 MG (21) tablet pack; Daily taper- 6/5/4/3/2/1    Acute URI  -     doxycycline (MONODOX) 100 MG capsule; Take 1 capsule by mouth 2 (Two) Times a Day.    Mild persistent asthma without complication  -     albuterol sulfate HFA (PROAIR HFA) 108 (90 Base) MCG/ACT inhaler; Inhale 2 puffs Every 6 (Six) Hours As Needed for Wheezing.       Follow up as needed.

## 2019-09-05 NOTE — ASSESSMENT & PLAN NOTE
BG control stable/good with a1C 5.6; encouraged reg phys activity to decr insulin resistance, moderation in unhealthy starches/sweets; f/u A1C in 6 mos

## 2019-09-06 ENCOUNTER — OFFICE VISIT (OUTPATIENT)
Dept: INTERNAL MEDICINE | Facility: CLINIC | Age: 59
End: 2019-09-06

## 2019-09-06 VITALS
OXYGEN SATURATION: 98 % | DIASTOLIC BLOOD PRESSURE: 76 MMHG | BODY MASS INDEX: 23.52 KG/M2 | HEART RATE: 56 BPM | HEIGHT: 71 IN | SYSTOLIC BLOOD PRESSURE: 124 MMHG | WEIGHT: 168 LBS

## 2019-09-06 DIAGNOSIS — R73.01 IMPAIRED FASTING GLUCOSE: Primary | ICD-10-CM

## 2019-09-06 LAB — HBA1C MFR BLD: 5.6 %

## 2019-09-06 PROCEDURE — 83036 HEMOGLOBIN GLYCOSYLATED A1C: CPT | Performed by: INTERNAL MEDICINE

## 2019-09-06 PROCEDURE — 90471 IMMUNIZATION ADMIN: CPT | Performed by: INTERNAL MEDICINE

## 2019-09-06 PROCEDURE — 99213 OFFICE O/P EST LOW 20 MIN: CPT | Performed by: INTERNAL MEDICINE

## 2019-09-06 PROCEDURE — 90674 CCIIV4 VAC NO PRSV 0.5 ML IM: CPT | Performed by: INTERNAL MEDICINE

## 2019-09-06 NOTE — PROGRESS NOTES
"Chief Complaint   Patient presents with   • Impaired Fasting Glucose       History of Present Illness  59 y.o.  woman presents for sugar follow-up. Feels well overall without complaints.     Reports her mother has postherpetic neuralgia, left neck, sxs ongoing > 1 yr. Also has some mild dementia.    Review of Systems  Denies CP, palpitations, SOB, lightheadedness. All other ROS reviewed and negative.    [facility]  The following portions of the patient's history were reviewed and updated as appropriate: allergies, current medications, past family history, past medical history, past social history, past surgical history and problem list.    Current Outpatient Medications:   •  albuterol sulfate HFA (PROAIR HFA) 108 (90 Base) MCG/ACT inhaler prn  •  atorvastatin (LIPITOR) 10 MG QD  •  budesonide-formoterol (SYMBICORT) 80-4.5 MCG/ACT inhaler, 2 puffs BID  •  Cholecalciferol (VITAMIN D-3) 1000 UNITS QD  •  mometasone (NASONEX) 50 MCG/ACT nasal spray, Ad  •  Naproxen Sodium (ALEVE) 220 MG capsule BID prn      VITALS:  /76   Pulse 56   Ht 179.1 cm (70.5\")   Wt 76.2 kg (168 lb)   SpO2 98%   BMI 23.76 kg/m²     Physical Exam   Constitutional: She is oriented to person, place, and time. She appears well-developed and well-nourished.   Eyes: Conjunctivae and EOM are normal.   Cardiovascular: Normal rate, regular rhythm and normal heart sounds.   Pulmonary/Chest: Effort normal and breath sounds normal. No respiratory distress.   Musculoskeletal:   Normal gait   Neurological: She is alert and oriented to person, place, and time.   Psychiatric: She has a normal mood and affect. Her behavior is normal.   Nursing note and vitals reviewed.      LABS  Results for orders placed or performed in visit on 09/06/19   POC Glycosylated Hemoglobin (Hb A1C)   Result Value Ref Range    Hemoglobin A1C 5.6 %     2/19 A1C 5.9    ASSESSMENT/PLAN  Problem List Items Addressed This Visit     Impaired fasting glucose - Primary     BG " control stable/good with a1C 5.6; encouraged reg phys activity to decr insulin resistance, moderation in unhealthy starches/sweets; f/u A1C in 6 mos           Relevant Orders    POC Glycosylated Hemoglobin (Hb A1C) (Completed)          FOLLOW-UP  1. Health maintenance - flu vacc given today; rec Shingrix (she states free at school but none available, therefore rec looking into office coverage)  3. RTC 3/2/20 for PE; fasting labs wk prior to appt (CBC, CMP, TSH, lipids, UA/micr, A1C, vit D, CPK)    Electronically signed by:    Alia Limon MD  09/06/2019

## 2019-10-03 ENCOUNTER — TRANSCRIBE ORDERS (OUTPATIENT)
Dept: ADMINISTRATIVE | Facility: HOSPITAL | Age: 59
End: 2019-10-03

## 2019-10-03 DIAGNOSIS — Z12.31 VISIT FOR SCREENING MAMMOGRAM: Primary | ICD-10-CM

## 2019-12-13 ENCOUNTER — HOSPITAL ENCOUNTER (OUTPATIENT)
Dept: MAMMOGRAPHY | Facility: HOSPITAL | Age: 59
Discharge: HOME | End: 2019-12-13
Admitting: INTERNAL MEDICINE

## 2019-12-13 DIAGNOSIS — Z12.31 VISIT FOR SCREENING MAMMOGRAM: ICD-10-CM

## 2019-12-13 PROCEDURE — 77067 SCR MAMMO BI INCL CAD: CPT | Performed by: RADIOLOGY

## 2019-12-13 PROCEDURE — 77067 SCR MAMMO BI INCL CAD: CPT

## 2019-12-13 PROCEDURE — 77063 BREAST TOMOSYNTHESIS BI: CPT

## 2019-12-13 PROCEDURE — 77063 BREAST TOMOSYNTHESIS BI: CPT | Performed by: RADIOLOGY

## 2020-01-14 ENCOUNTER — OFFICE VISIT (OUTPATIENT)
Dept: INTERNAL MEDICINE | Facility: CLINIC | Age: 60
End: 2020-01-14

## 2020-01-14 VITALS
SYSTOLIC BLOOD PRESSURE: 130 MMHG | TEMPERATURE: 98.7 F | OXYGEN SATURATION: 98 % | DIASTOLIC BLOOD PRESSURE: 84 MMHG | BODY MASS INDEX: 23.76 KG/M2 | HEART RATE: 52 BPM | WEIGHT: 168 LBS

## 2020-01-14 DIAGNOSIS — J45.30 MILD PERSISTENT ASTHMA WITHOUT COMPLICATION: ICD-10-CM

## 2020-01-14 PROCEDURE — 99213 OFFICE O/P EST LOW 20 MIN: CPT | Performed by: PHYSICIAN ASSISTANT

## 2020-01-14 RX ORDER — METHYLPREDNISOLONE 4 MG/1
TABLET ORAL
Qty: 21 TABLET | Refills: 0 | Status: SHIPPED | OUTPATIENT
Start: 2020-01-14 | End: 2020-01-15 | Stop reason: SDUPTHER

## 2020-01-14 RX ORDER — BUDESONIDE AND FORMOTEROL FUMARATE DIHYDRATE 80; 4.5 UG/1; UG/1
2 AEROSOL RESPIRATORY (INHALATION)
Qty: 10.2 G | Refills: 1 | Status: SHIPPED | OUTPATIENT
Start: 2020-01-14 | End: 2020-01-15 | Stop reason: SDUPTHER

## 2020-01-14 RX ORDER — ALBUTEROL SULFATE 90 UG/1
2 AEROSOL, METERED RESPIRATORY (INHALATION) EVERY 6 HOURS PRN
Qty: 1 INHALER | Refills: 1 | Status: SHIPPED | OUTPATIENT
Start: 2020-01-14 | End: 2020-03-09 | Stop reason: SDUPTHER

## 2020-01-14 NOTE — PROGRESS NOTES
Chief Complaint   Patient presents with   • Asthma     Acute   • Cough       Subjective   Memo Guajardo is a 59 y.o. female.       History of Present Illness     Pt has had a cough for about a month. Thought it was allergy related initially, has tried antihistamine and expectorant without any improvement. She is using albuterol inhaler that helps temporarily. Typically has to use Symbicort in the winter because of uncontrolled asthma symptoms, typically resolves in the spring. Cough is productive with white/yellow sputum. No sinus congestion or pain. No fever or chest pain. Has tightness in her chest, some shortness of breath.        Current Outpatient Medications:   •  albuterol sulfate HFA (PROAIR HFA) 108 (90 Base) MCG/ACT inhaler, Inhale 2 puffs Every 6 (Six) Hours As Needed for Wheezing., Disp: 1 inhaler, Rfl: 1  •  atorvastatin (LIPITOR) 10 MG tablet, Take 1 tablet by mouth Every Night., Disp: 90 tablet, Rfl: 3  •  budesonide-formoterol (SYMBICORT) 80-4.5 MCG/ACT inhaler, Inhale 2 puffs 2 (Two) Times a Day., Disp: 10.2 g, Rfl: 1  •  Cholecalciferol (VITAMIN D-3) 1000 UNITS capsule, Take 1,000 Units by mouth Daily., Disp: , Rfl:   •  mometasone (NASONEX) 50 MCG/ACT nasal spray, 2 sprays into the nostril(s) as directed by provider Daily., Disp: , Rfl:   •  Naproxen Sodium (ALEVE) 220 MG capsule, Take 220 mg by mouth 2 (Two) Times a Day As Needed., Disp: , Rfl:   •  methylPREDNISolone (MEDROL) 4 MG tablet, follow package directions, Disp: 21 tablet, Rfl: 0     PMFSH  The following portions of the patient's history were reviewed and updated as appropriate: allergies, current medications, past family history, past medical history, past social history, past surgical history and problem list.    Review of Systems   Constitutional: Negative for activity change, appetite change and fatigue.   HENT: Negative for congestion and rhinorrhea.    Respiratory: Positive for cough, chest tightness, shortness of breath and  wheezing.    Cardiovascular: Negative for chest pain and palpitations.   Gastrointestinal: Negative for abdominal pain.   Genitourinary: Negative for dysuria.   Musculoskeletal: Negative for arthralgias and myalgias.   Neurological: Negative for dizziness, weakness, light-headedness and headaches.   Psychiatric/Behavioral: Negative for dysphoric mood. The patient is not nervous/anxious.        Objective   /84   Pulse 52   Temp 98.7 °F (37.1 °C)   Wt 76.2 kg (168 lb)   SpO2 98%   BMI 23.76 kg/m²     Physical Exam   Constitutional: She appears well-developed and well-nourished.   HENT:   Head: Normocephalic.   Right Ear: Hearing, tympanic membrane, external ear and ear canal normal.   Left Ear: Hearing, tympanic membrane, external ear and ear canal normal.   Nose: Nose normal.   Mouth/Throat: Oropharynx is clear and moist.   Eyes: Pupils are equal, round, and reactive to light. Conjunctivae are normal.   Neck: Normal range of motion.   Cardiovascular: Normal rate, regular rhythm and normal heart sounds.   Pulmonary/Chest: Effort normal and breath sounds normal. She has no decreased breath sounds. She has no wheezes (breathing seems tight but no distinct wheezing). She has no rhonchi. She has no rales.   Musculoskeletal: Normal range of motion.   Neurological: She is alert.   Skin: Skin is warm and dry.   Psychiatric: She has a normal mood and affect. Her behavior is normal.   Nursing note and vitals reviewed.           ASSESSMENT/PLAN    Problem List Items Addressed This Visit        Respiratory    Mild persistent asthma without complication     Allergic asthma exacerbation, typical for this time of year. Start medrol dose pack as directed. Restart symbicort as used in the past. Continue prn albuterol. Call or rtc if worsening or no better.           Relevant Medications    albuterol sulfate HFA (PROAIR HFA) 108 (90 Base) MCG/ACT inhaler    methylPREDNISolone (MEDROL) 4 MG tablet    budesonide-formoterol  (SYMBICORT) 80-4.5 MCG/ACT inhaler               Return if symptoms worsen or fail to improve, for Next scheduled follow up.

## 2020-01-15 RX ORDER — METHYLPREDNISOLONE 4 MG/1
TABLET ORAL
Qty: 21 TABLET | Refills: 0 | Status: SHIPPED | OUTPATIENT
Start: 2020-01-15 | End: 2020-01-21

## 2020-01-15 RX ORDER — BUDESONIDE AND FORMOTEROL FUMARATE DIHYDRATE 80; 4.5 UG/1; UG/1
2 AEROSOL RESPIRATORY (INHALATION)
Qty: 10.2 G | Refills: 1 | Status: SHIPPED | OUTPATIENT
Start: 2020-01-15 | End: 2020-03-09 | Stop reason: SDUPTHER

## 2020-01-15 NOTE — ASSESSMENT & PLAN NOTE
Allergic asthma exacerbation, typical for this time of year. Start medrol dose pack as directed. Restart symbicort as used in the past. Continue prn albuterol. Call or rtc if worsening or no better.

## 2020-02-10 ENCOUNTER — TELEPHONE (OUTPATIENT)
Dept: INTERNAL MEDICINE | Facility: CLINIC | Age: 60
End: 2020-02-10

## 2020-02-10 DIAGNOSIS — E55.9 VITAMIN D DEFICIENCY: ICD-10-CM

## 2020-02-10 DIAGNOSIS — R73.01 IMPAIRED FASTING GLUCOSE: ICD-10-CM

## 2020-02-10 DIAGNOSIS — E78.00 PURE HYPERCHOLESTEROLEMIA: ICD-10-CM

## 2020-02-10 DIAGNOSIS — Z00.00 PE (PHYSICAL EXAM), ROUTINE: Primary | ICD-10-CM

## 2020-02-25 ENCOUNTER — LAB (OUTPATIENT)
Dept: LAB | Facility: HOSPITAL | Age: 60
End: 2020-02-25

## 2020-02-25 DIAGNOSIS — R94.6 ABNORMAL RESULTS OF THYROID FUNCTION STUDIES: Primary | ICD-10-CM

## 2020-02-25 DIAGNOSIS — Z00.00 PE (PHYSICAL EXAM), ROUTINE: ICD-10-CM

## 2020-02-25 DIAGNOSIS — E78.00 PURE HYPERCHOLESTEROLEMIA: ICD-10-CM

## 2020-02-25 DIAGNOSIS — R73.01 IMPAIRED FASTING GLUCOSE: ICD-10-CM

## 2020-02-25 DIAGNOSIS — E55.9 VITAMIN D DEFICIENCY: ICD-10-CM

## 2020-02-25 LAB
25(OH)D3 SERPL-MCNC: 40.7 NG/ML (ref 30–100)
ALBUMIN SERPL-MCNC: 4.5 G/DL (ref 3.5–5.2)
ALBUMIN/GLOB SERPL: 1.7 G/DL
ALP SERPL-CCNC: 53 U/L (ref 39–117)
ALT SERPL W P-5'-P-CCNC: 20 U/L (ref 1–33)
ANION GAP SERPL CALCULATED.3IONS-SCNC: 11.2 MMOL/L (ref 5–15)
AST SERPL-CCNC: 24 U/L (ref 1–32)
BACTERIA UR QL AUTO: NORMAL /HPF
BASOPHILS # BLD AUTO: 0.07 10*3/MM3 (ref 0–0.2)
BASOPHILS NFR BLD AUTO: 1.2 % (ref 0–1.5)
BILIRUB SERPL-MCNC: 0.5 MG/DL (ref 0.2–1.2)
BILIRUB UR QL STRIP: NEGATIVE
BUN BLD-MCNC: 14 MG/DL (ref 8–23)
BUN/CREAT SERPL: 17.1 (ref 7–25)
CALCIUM SPEC-SCNC: 9.6 MG/DL (ref 8.6–10.5)
CHLORIDE SERPL-SCNC: 100 MMOL/L (ref 98–107)
CHOLEST SERPL-MCNC: 202 MG/DL (ref 0–200)
CK SERPL-CCNC: 124 U/L (ref 20–180)
CLARITY UR: CLEAR
CO2 SERPL-SCNC: 25.8 MMOL/L (ref 22–29)
COLOR UR: YELLOW
CREAT BLD-MCNC: 0.82 MG/DL (ref 0.57–1)
DEPRECATED RDW RBC AUTO: 44.9 FL (ref 37–54)
EOSINOPHIL # BLD AUTO: 0.46 10*3/MM3 (ref 0–0.4)
EOSINOPHIL NFR BLD AUTO: 8.2 % (ref 0.3–6.2)
ERYTHROCYTE [DISTWIDTH] IN BLOOD BY AUTOMATED COUNT: 13.3 % (ref 12.3–15.4)
GFR SERPL CREATININE-BSD FRML MDRD: 71 ML/MIN/1.73
GLOBULIN UR ELPH-MCNC: 2.7 GM/DL
GLUCOSE BLD-MCNC: 88 MG/DL (ref 65–99)
GLUCOSE UR STRIP-MCNC: NEGATIVE MG/DL
HBA1C MFR BLD: 5.9 % (ref 4.8–5.6)
HCT VFR BLD AUTO: 40 % (ref 34–46.6)
HDLC SERPL-MCNC: 95 MG/DL (ref 40–60)
HGB BLD-MCNC: 13 G/DL (ref 12–15.9)
HGB UR QL STRIP.AUTO: NEGATIVE
HYALINE CASTS UR QL AUTO: NORMAL /LPF
IMM GRANULOCYTES # BLD AUTO: 0.01 10*3/MM3 (ref 0–0.05)
IMM GRANULOCYTES NFR BLD AUTO: 0.2 % (ref 0–0.5)
KETONES UR QL STRIP: NEGATIVE
LDLC SERPL CALC-MCNC: 95 MG/DL (ref 0–100)
LDLC/HDLC SERPL: 1 {RATIO}
LEUKOCYTE ESTERASE UR QL STRIP.AUTO: NEGATIVE
LYMPHOCYTES # BLD AUTO: 1.66 10*3/MM3 (ref 0.7–3.1)
LYMPHOCYTES NFR BLD AUTO: 29.4 % (ref 19.6–45.3)
MCH RBC QN AUTO: 29.4 PG (ref 26.6–33)
MCHC RBC AUTO-ENTMCNC: 32.5 G/DL (ref 31.5–35.7)
MCV RBC AUTO: 90.5 FL (ref 79–97)
MONOCYTES # BLD AUTO: 0.59 10*3/MM3 (ref 0.1–0.9)
MONOCYTES NFR BLD AUTO: 10.5 % (ref 5–12)
NEUTROPHILS # BLD AUTO: 2.85 10*3/MM3 (ref 1.7–7)
NEUTROPHILS NFR BLD AUTO: 50.5 % (ref 42.7–76)
NITRITE UR QL STRIP: NEGATIVE
NRBC BLD AUTO-RTO: 0 /100 WBC (ref 0–0.2)
PH UR STRIP.AUTO: 5.5 [PH] (ref 5–8)
PLATELET # BLD AUTO: 334 10*3/MM3 (ref 140–450)
PMV BLD AUTO: 10.2 FL (ref 6–12)
POTASSIUM BLD-SCNC: 4 MMOL/L (ref 3.5–5.2)
PROT SERPL-MCNC: 7.2 G/DL (ref 6–8.5)
PROT UR QL STRIP: NEGATIVE
RBC # BLD AUTO: 4.42 10*6/MM3 (ref 3.77–5.28)
RBC # UR: NORMAL /HPF
REF LAB TEST METHOD: NORMAL
SODIUM BLD-SCNC: 137 MMOL/L (ref 136–145)
SP GR UR STRIP: 1.02 (ref 1–1.03)
SQUAMOUS #/AREA URNS HPF: NORMAL /HPF
TRIGL SERPL-MCNC: 61 MG/DL (ref 0–150)
TSH SERPL DL<=0.05 MIU/L-ACNC: 4.36 UIU/ML (ref 0.27–4.2)
UROBILINOGEN UR QL STRIP: NORMAL
VLDLC SERPL-MCNC: 12.2 MG/DL (ref 5–40)
WBC NRBC COR # BLD: 5.64 10*3/MM3 (ref 3.4–10.8)
WBC UR QL AUTO: NORMAL /HPF

## 2020-02-25 PROCEDURE — 81001 URINALYSIS AUTO W/SCOPE: CPT | Performed by: INTERNAL MEDICINE

## 2020-02-25 PROCEDURE — 82550 ASSAY OF CK (CPK): CPT | Performed by: INTERNAL MEDICINE

## 2020-02-25 PROCEDURE — 83036 HEMOGLOBIN GLYCOSYLATED A1C: CPT | Performed by: INTERNAL MEDICINE

## 2020-02-25 PROCEDURE — 82306 VITAMIN D 25 HYDROXY: CPT | Performed by: INTERNAL MEDICINE

## 2020-02-25 PROCEDURE — 85025 COMPLETE CBC W/AUTO DIFF WBC: CPT | Performed by: INTERNAL MEDICINE

## 2020-02-25 PROCEDURE — 80053 COMPREHEN METABOLIC PANEL: CPT | Performed by: INTERNAL MEDICINE

## 2020-02-25 PROCEDURE — 84443 ASSAY THYROID STIM HORMONE: CPT | Performed by: INTERNAL MEDICINE

## 2020-02-25 PROCEDURE — 80061 LIPID PANEL: CPT | Performed by: INTERNAL MEDICINE

## 2020-03-01 NOTE — ASSESSMENT & PLAN NOTE
Health maintenance - flu 9/19; PVX given today; Tdap 10/11 (next Td due 2021); HAV done; rec Shingrix - check into coverage in the office; mammo 12/19; nl Pap 3/19, repeat 2022; DEXA 4/19, repeat 2021-22; colonosc 7/25/17, repeat 2027 per Dr. Stewart; eye exam pending 5/20; dental exam q6 mos; (+) seat belt use

## 2020-03-01 NOTE — ASSESSMENT & PLAN NOTE
Bord TSH 4.36; reviewed s/sxs hypothyroidism; note h/o thyroiditis; follow clinically - repeat TFTs in 6 mos with next labs at the latest

## 2020-03-01 NOTE — ASSESSMENT & PLAN NOTE
PFTs as noted - moderate obstruction; on symbicort 80/4.5 2 puffs BID with prn alb - renew when needed

## 2020-03-02 ENCOUNTER — OFFICE VISIT (OUTPATIENT)
Dept: INTERNAL MEDICINE | Facility: CLINIC | Age: 60
End: 2020-03-02

## 2020-03-02 VITALS
DIASTOLIC BLOOD PRESSURE: 72 MMHG | HEIGHT: 71 IN | BODY MASS INDEX: 23.8 KG/M2 | HEART RATE: 55 BPM | SYSTOLIC BLOOD PRESSURE: 118 MMHG | WEIGHT: 170 LBS | OXYGEN SATURATION: 98 %

## 2020-03-02 DIAGNOSIS — Z00.00 PE (PHYSICAL EXAM), ROUTINE: Primary | ICD-10-CM

## 2020-03-02 DIAGNOSIS — M85.89 OSTEOPENIA OF MULTIPLE SITES: ICD-10-CM

## 2020-03-02 DIAGNOSIS — E55.9 VITAMIN D DEFICIENCY: ICD-10-CM

## 2020-03-02 DIAGNOSIS — E06.1 SUBACUTE THYROIDITIS: ICD-10-CM

## 2020-03-02 DIAGNOSIS — E78.00 PURE HYPERCHOLESTEROLEMIA: ICD-10-CM

## 2020-03-02 DIAGNOSIS — R73.01 IMPAIRED FASTING GLUCOSE: ICD-10-CM

## 2020-03-02 DIAGNOSIS — J45.30 MILD PERSISTENT ASTHMA WITHOUT COMPLICATION: ICD-10-CM

## 2020-03-02 PROCEDURE — 93000 ELECTROCARDIOGRAM COMPLETE: CPT | Performed by: INTERNAL MEDICINE

## 2020-03-02 PROCEDURE — 94060 EVALUATION OF WHEEZING: CPT | Performed by: INTERNAL MEDICINE

## 2020-03-02 PROCEDURE — 90732 PPSV23 VACC 2 YRS+ SUBQ/IM: CPT | Performed by: INTERNAL MEDICINE

## 2020-03-02 PROCEDURE — 90471 IMMUNIZATION ADMIN: CPT | Performed by: INTERNAL MEDICINE

## 2020-03-02 PROCEDURE — 99396 PREV VISIT EST AGE 40-64: CPT | Performed by: INTERNAL MEDICINE

## 2020-03-02 RX ORDER — ATORVASTATIN CALCIUM 10 MG/1
10 TABLET, FILM COATED ORAL NIGHTLY
Qty: 90 TABLET | Refills: 3 | Status: SHIPPED | OUTPATIENT
Start: 2020-03-02 | End: 2021-03-05 | Stop reason: SDUPTHER

## 2020-03-02 NOTE — PROGRESS NOTES
"Chief Complaint   Patient presents with   • Annual Exam       History of Present Illness  59 y.o.  woman presents for updated phys examination. Has felt a little tired, under the weather, similar to previous thyroidiitis but no neck pain/discomfort.    Review of Systems  Denies headaches, visual changes, CP, palpitations, SOB, cough, abd pain, n/v/d, difficulty with urination, numbness/tingling, falls, mood changes, lightheadedness, hearing changes, rashes.    Denies vaginal discharge or bleeding (no periods) or breast concerns.   All other ROS reviewed and negative.    [facility]  The following portions of the patient's history were reviewed and updated as appropriate: allergies, current medications, past family history, past medical history, past social history, past surgical history and problem list.      Current Outpatient Medications:   •  albuterol sulfate HFA (PROAIR HFA) 108 (90 Base) MCG/ACT inhaler prn  •  atorvastatin (LIPITOR) 10 MG QD  •  budesonide-formoterol (SYMBICORT) 80-4.5 MCG/ACT inhaler 2 puffs BID  •  Cholecalciferol (VITAMIN D-3) 1000 UNITS QD  •  mometasone (NASONEX) 50 MCG/ACT nasal spray AD  •  Naproxen Sodium (ALEVE) 220 MG BID prn      VITALS:  /72   Pulse 55   Ht 179.1 cm (70.5\")   Wt 77.1 kg (170 lb)   SpO2 98%   BMI 24.05 kg/m²     Physical Exam   Constitutional: She is oriented to person, place, and time. She appears well-developed and well-nourished.   HENT:   Head: Normocephalic.   Right Ear: External ear normal.   Nose: Nose normal.   Mouth/Throat: Oropharynx is clear and moist and mucous membranes are normal. No oropharyngeal exudate.   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 Conjunctivae and EOM are normal.   Neck: Normal range of motion. Neck supple. Carotid bruit is not present (bilaterally). No thyromegaly present.   Cardiovascular: Normal " rate, regular rhythm and normal heart sounds.   Pulmonary/Chest: Effort normal and breath sounds normal. No respiratory distress. She has no wheezes. She has no rales. She exhibits no tenderness.   Abdominal: Soft. Bowel sounds are normal. She exhibits no distension and no mass. There is no hepatosplenomegaly. There is no tenderness.   Musculoskeletal: She exhibits no edema.   Lymphadenopathy:     She has no cervical adenopathy.   Neurological: She is alert and oriented to person, place, and time. She displays normal reflexes. No cranial nerve deficit.   Skin: Skin is warm and dry. No rash noted.   Psychiatric: She has a normal mood and affect. Her behavior is normal.   Nursing note and vitals reviewed.        LABS  Results for orders placed or performed in visit on 02/25/20   Comprehensive Metabolic Panel   Result Value Ref Range    Glucose 88 65 - 99 mg/dL    BUN 14 8 - 23 mg/dL    Creatinine 0.82 0.57 - 1.00 mg/dL    Sodium 137 136 - 145 mmol/L    Potassium 4.0 3.5 - 5.2 mmol/L    Chloride 100 98 - 107 mmol/L    CO2 25.8 22.0 - 29.0 mmol/L    Calcium 9.6 8.6 - 10.5 mg/dL    Total Protein 7.2 6.0 - 8.5 g/dL    Albumin 4.50 3.50 - 5.20 g/dL    ALT (SGPT) 20 1 - 33 U/L    AST (SGOT) 24 1 - 32 U/L    Alkaline Phosphatase 53 39 - 117 U/L    Total Bilirubin 0.5 0.2 - 1.2 mg/dL    eGFR Non African Amer 71 >60 mL/min/1.73    Globulin 2.7 gm/dL    A/G Ratio 1.7 g/dL    BUN/Creatinine Ratio 17.1 7.0 - 25.0    Anion Gap 11.2 5.0 - 15.0 mmol/L   Lipid Panel   Result Value Ref Range    Total Cholesterol 202 (H) 0 - 200 mg/dL    Triglycerides 61 0 - 150 mg/dL    HDL Cholesterol 95 (H) 40 - 60 mg/dL    LDL Cholesterol  95 0 - 100 mg/dL    VLDL Cholesterol 12.2 5 - 40 mg/dL    LDL/HDL Ratio 1.00    TSH   Result Value Ref Range    TSH 4.360 (H) 0.270 - 4.200 uIU/mL   Vitamin D 25 Hydroxy   Result Value Ref Range    25 Hydroxy, Vitamin D 40.7 30.0 - 100.0 ng/ml   Hemoglobin A1c   Result Value Ref Range    Hemoglobin A1C 5.90 (H)  4.80 - 5.60 %   CK   Result Value Ref Range    Creatine Kinase 124 20 - 180 U/L   CBC Auto Differential   Result Value Ref Range    WBC 5.64 3.40 - 10.80 10*3/mm3    RBC 4.42 3.77 - 5.28 10*6/mm3    Hemoglobin 13.0 12.0 - 15.9 g/dL    Hematocrit 40.0 34.0 - 46.6 %    MCV 90.5 79.0 - 97.0 fL    MCH 29.4 26.6 - 33.0 pg    MCHC 32.5 31.5 - 35.7 g/dL    RDW 13.3 12.3 - 15.4 %    RDW-SD 44.9 37.0 - 54.0 fl    MPV 10.2 6.0 - 12.0 fL    Platelets 334 140 - 450 10*3/mm3    Neutrophil % 50.5 42.7 - 76.0 %    Lymphocyte % 29.4 19.6 - 45.3 %    Monocyte % 10.5 5.0 - 12.0 %    Eosinophil % 8.2 (H) 0.3 - 6.2 %    Basophil % 1.2 0.0 - 1.5 %    Immature Grans % 0.2 0.0 - 0.5 %    Neutrophils, Absolute 2.85 1.70 - 7.00 10*3/mm3    Lymphocytes, Absolute 1.66 0.70 - 3.10 10*3/mm3    Monocytes, Absolute 0.59 0.10 - 0.90 10*3/mm3    Eosinophils, Absolute 0.46 (H) 0.00 - 0.40 10*3/mm3    Basophils, Absolute 0.07 0.00 - 0.20 10*3/mm3    Immature Grans, Absolute 0.01 0.00 - 0.05 10*3/mm3    nRBC 0.0 0.0 - 0.2 /100 WBC   Urinalysis without microscopic (no culture) - Urine, Clean Catch   Result Value Ref Range    Color, UA Yellow Yellow, Straw    Appearance, UA Clear Clear    pH, UA 5.5 5.0 - 8.0    Specific Gravity, UA 1.020 1.005 - 1.030    Glucose, UA Negative Negative    Ketones, UA Negative Negative    Bilirubin, UA Negative Negative    Blood, UA Negative Negative    Protein, UA Negative Negative    Leuk Esterase, UA Negative Negative    Nitrite, UA Negative Negative    Urobilinogen, UA 0.2 E.U./dL 0.2 - 1.0 E.U./dL   Urinalysis, Microscopic Only - Urine, Clean Catch   Result Value Ref Range    RBC, UA 0-2 None Seen, 0-2 /HPF    WBC, UA 0-2 None Seen, 0-2 /HPF    Bacteria, UA None Seen None Seen /HPF    Squamous Epithelial Cells, UA 0-2 None Seen, 0-2 /HPF    Hyaline Casts, UA None Seen None Seen /LPF    Methodology Automated Microscopy      9/19 A1C 5.6    ECG 12 Lead  Date/Time: 3/2/2020 3:08 PM  Performed by: Alia Limon,  MD  Authorized by: Alia Limon MD   Comparison: compared with previous ECG   Similar to previous ECG  Rhythm: sinus rhythm, sinus bradycardia and sinus arrhythmia  Rate: normal  BPM: 53  Conduction: conduction normal  ST Segments: ST segments normal  T Waves: T waves normal  QRS axis: normal  Clinical impression comment: stable EKG            PFTs: dx - asthma  The degree of obstruction is moderate with FEV1 of 60 % predicted.    FEV1 60 %, FVC 73 %, FEV1/FVC 82, no post-BDR      ASSESSMENT/PLAN  Problem List Items Addressed This Visit     Vitamin D deficiency     stable level 40.6 on 1000 units QD supplementation         Subacute thyroiditis     Bord TSH 4.36; reviewed s/sxs hypothyroidism; note h/o thyroiditis; follow clinically - repeat TFTs in 6 mos with next labs at the latest         Relevant Orders    TSH    T4, Free    PE (physical exam), routine - Primary     Health maintenance - flu 9/19; PVX given today; Tdap 10/11 (next Td due 2021); HAV done; rec Shingrix - check into coverage in the office; mammo 12/19; nl Pap 3/19, repeat 2022; DEXA 4/19, repeat 2021-22; colonosc 7/25/17, repeat 2027 per Dr. Stewart; eye exam pending 5/20; dental exam q6 mos; (+) seat belt use         Osteopenia     Calcium and vitamin D supplementation with weight-bearing exercise; DEXA 4/19, repeat 2021-22            Mild persistent asthma without complication     PFTs as noted - moderate obstruction; on symbicort 80/4.5 2 puffs BID with prn alb - renew when needed           Impaired fasting glucose     BG control stable with A1C 5.9; encouraged reg phys activity to decr insulin resistance, moderation in unhealthy starches/sweets; f/u A1C in 6 mos           Hyperlipidemia     Lipids stable with LDL 95; goal LDL < 130, ideally < 100; on atorvastatin 10mg QHS #90, 3RF         Relevant Medications    atorvastatin (LIPITOR) 10 MG tablet    Other Relevant Orders    ECG 12 Lead          FOLLOW-UP  RTC 6 mos with A1C, TFTs  (escribed)    Electronically signed by:    Alia Limon MD, FACP  03/02/2020

## 2020-03-09 ENCOUNTER — OFFICE VISIT (OUTPATIENT)
Dept: INTERNAL MEDICINE | Facility: CLINIC | Age: 60
End: 2020-03-09

## 2020-03-09 VITALS
TEMPERATURE: 99 F | WEIGHT: 168.4 LBS | OXYGEN SATURATION: 97 % | BODY MASS INDEX: 23.57 KG/M2 | SYSTOLIC BLOOD PRESSURE: 118 MMHG | HEART RATE: 57 BPM | DIASTOLIC BLOOD PRESSURE: 80 MMHG | HEIGHT: 71 IN | RESPIRATION RATE: 20 BRPM

## 2020-03-09 DIAGNOSIS — J30.2 SEASONAL ALLERGIC RHINITIS, UNSPECIFIED TRIGGER: ICD-10-CM

## 2020-03-09 DIAGNOSIS — J45.41 MODERATE PERSISTENT ASTHMA WITH ACUTE EXACERBATION: Primary | ICD-10-CM

## 2020-03-09 PROCEDURE — 99214 OFFICE O/P EST MOD 30 MIN: CPT | Performed by: INTERNAL MEDICINE

## 2020-03-09 RX ORDER — BUDESONIDE AND FORMOTEROL FUMARATE DIHYDRATE 80; 4.5 UG/1; UG/1
2 AEROSOL RESPIRATORY (INHALATION)
Qty: 1 INHALER | Refills: 3 | Status: SHIPPED | OUTPATIENT
Start: 2020-03-09 | End: 2020-08-06 | Stop reason: SDUPTHER

## 2020-03-09 RX ORDER — FLUTICASONE PROPIONATE 50 MCG
2 SPRAY, SUSPENSION (ML) NASAL DAILY
COMMUNITY

## 2020-03-09 RX ORDER — ALBUTEROL SULFATE 90 UG/1
2 AEROSOL, METERED RESPIRATORY (INHALATION) EVERY 6 HOURS PRN
Qty: 1 INHALER | Refills: 1 | Status: SHIPPED | OUTPATIENT
Start: 2020-03-09

## 2020-03-09 RX ORDER — PREDNISONE 10 MG/1
TABLET ORAL
Qty: 30 TABLET | Refills: 0 | Status: SHIPPED | OUTPATIENT
Start: 2020-03-09 | End: 2020-03-21

## 2020-03-09 RX ORDER — AMOXICILLIN AND CLAVULANATE POTASSIUM 875; 125 MG/1; MG/1
1 TABLET, FILM COATED ORAL 2 TIMES DAILY
Qty: 14 TABLET | Refills: 0 | Status: SHIPPED | OUTPATIENT
Start: 2020-03-09 | End: 2020-03-16

## 2020-03-10 NOTE — ASSESSMENT & PLAN NOTE
Cont flonase and loratadine as well as nasal saline spray; note pred taper #30, 0RF and augmentin RX'd

## 2020-03-10 NOTE — PROGRESS NOTES
"Chief Complaint   Patient presents with   • URI       History of Present Illness  59 y.o.  woman presents for further evaluation of cold sxs, now developing chest tightness and wheezing. HPI last Thursday, 4 days ago, with nasal congestion, runny nose, fatigue. Subsequently developed chest congestion, cough, and chest tightness. Has been taking mucinex, loratadine, flonase, symbicort. Has had to use prn albuterol, which has provided some relief. Reports some difficulty in breathing. Feels worse this morning with chest tightnss. Denies fevers. (+) sick contacts at school.    Review of Systems  ROS (+) for nasal congestion, runny nose, fatigue, chest congestion, cough, wheezing, and chest tightness. Denies fevers/chills. Denies abd pain, n/v.  All other ROS reviewed and negative.    [facility]  The following portions of the patient's history were reviewed and updated as appropriate: allergies, current medications, past family history, past medical history, past social history, past surgical history and problem list.    Current Outpatient Medications:   •  albuterol sulfate HFA (PROAIR HFA) 108 (90 Base) MCG/ACT inhaler prn  •  atorvastatin 10 MG QD  •  budesonide-formoterol (SYMBICORT) 80-4.5 MCG/ACT inhaler 2 puffs BID  •  Cholecalciferol (VITAMIN D-3) 1000 UNITS qD  •  fluticasone (FLONASE) 50 MCG/ACT nasal spray  2 spr/nostril QD  •  Naproxen Sodium (ALEVE) 220 MG BId prn    VITALS:  /80   Pulse 57   Temp 99 °F (37.2 °C) (Temporal)   Resp 20   Ht 179.1 cm (70.5\")   Wt 76.4 kg (168 lb 6.4 oz)   SpO2 97%   BMI 23.82 kg/m²     Physical Exam   Constitutional: She is oriented to person, place, and time. She appears well-developed and well-nourished.   HENT:   Right Ear: External ear normal.   Mouth/Throat: No oropharyngeal exudate (minimal erythema and (+) posterior drainage, no exudate or swelling).   Nasal mucosa pale, mod swollen bilaterally without erythema     Eyes: Pupils are equal, round, and " reactive to light. Conjunctivae and EOM are normal.   Neck: Normal range of motion. Neck supple.   Cardiovascular: Normal rate, regular rhythm and normal heart sounds.   Pulmonary/Chest: Effort normal. No stridor. No respiratory distress. She has wheezes (diffuse expiratory wheezing bilaterally). She has rales (scattered rhonci bilaterally). She exhibits no tenderness.   Speaks in complete sentences   Abdominal: Soft. Bowel sounds are normal.   Musculoskeletal:   Normal gait   Lymphadenopathy:     She has no cervical adenopathy.   Neurological: She is alert and oriented to person, place, and time.   Skin: Skin is warm and dry.   Psychiatric: She has a normal mood and affect. Her behavior is normal.   Nursing note and vitals reviewed.      LABS  2/25/20 A1C 5.9    ASSESSMENT/PLAN  Problem List Items Addressed This Visit     Mild persistent asthma without complication - Primary     Asthma exacerbation likely due to sinusitis causing secondary bronchitis; 5d duration and worsening with abnl lung exam c/w asthma exacerbation; cont symbicort 80/4.5 2p BID with prn alb; RX pred taper #30, 0RF (reviewed s/e and how to take correctly) and augment 875mg BID x 7d (patient states she is already on probiotic); f/u in 1 week - call in the interim if continues to worsen and will pursue CXR           Relevant Medications    albuterol sulfate HFA (PROAIR HFA) 108 (90 Base) MCG/ACT inhaler    budesonide-formoterol (SYMBICORT) 80-4.5 MCG/ACT inhaler    amoxicillin-clavulanate (AUGMENTIN) 875-125 MG per tablet    Allergic rhinitis     Cont flonase and loratadine as well as nasal saline spray; note pred taper #30, 0RF and augmentin RX'd         Relevant Medications    predniSONE (DELTASONE) 10 MG tablet          FOLLOW-UP  RTC 1 week for f/u asthma exacerbation; call in the meantime if concerns    Electronically signed by:    Alia Limon MD, FACP  03/09/2020

## 2020-03-10 NOTE — ASSESSMENT & PLAN NOTE
Asthma exacerbation likely due to sinusitis causing secondary bronchitis; 5d duration and worsening with abnl lung exam c/w asthma exacerbation; cont symbicort 80/4.5 2p BID with prn alb; RX pred taper #30, 0RF (reviewed s/e and how to take correctly) and augment 875mg BID x 7d (patient states she is already on probiotic); f/u in 1 week - call in the interim if continues to worsen and will pursue CXR

## 2020-03-17 ENCOUNTER — OFFICE VISIT (OUTPATIENT)
Dept: INTERNAL MEDICINE | Facility: CLINIC | Age: 60
End: 2020-03-17

## 2020-03-17 VITALS
BODY MASS INDEX: 23.52 KG/M2 | SYSTOLIC BLOOD PRESSURE: 122 MMHG | HEART RATE: 57 BPM | DIASTOLIC BLOOD PRESSURE: 64 MMHG | OXYGEN SATURATION: 98 % | HEIGHT: 71 IN | WEIGHT: 168 LBS

## 2020-03-17 DIAGNOSIS — R94.6 ABNORMAL RESULTS OF THYROID FUNCTION STUDIES: ICD-10-CM

## 2020-03-17 DIAGNOSIS — J45.31 MILD PERSISTENT ASTHMA WITH ACUTE EXACERBATION: Primary | ICD-10-CM

## 2020-03-17 DIAGNOSIS — J30.2 SEASONAL ALLERGIC RHINITIS, UNSPECIFIED TRIGGER: ICD-10-CM

## 2020-03-17 PROCEDURE — 99213 OFFICE O/P EST LOW 20 MIN: CPT | Performed by: INTERNAL MEDICINE

## 2020-03-17 NOTE — PROGRESS NOTES
"Chief Complaint   Patient presents with   • Asthma   • Bronchitis       History of Present Illness  59 y.o.  woman presents for f/u on asthma exacerbation due to bronchitis. Feels significantly better, almost with no further chest tightness.  Has decreased cough.  Has no fevers/chills. Has 3 more days of prednisone taper. Repairs on symbicort and allergy meds.     Review of Systems  ROS significant for decreased cough, minimal further chest tightness. Denies SOB, GABI, CP, palpitations, fevers/chills. All other ROS reviewed and negative.    [facility]  The following portions of the patient's history were reviewed and updated as appropriate: allergies, current medications, past family history, past medical history, past social history, past surgical history and problem list.      Current Outpatient Medications:   •  albuterol sulfate HFA (PROAIR HFA) 108 (90 Base) MCG/ACT inhaler prn  •  atorvastatin (LIPITOR) 10 MG QD  •  budesonide-formoterol (SYMBICORT) 80-4.5 MCG/ACT inhaler 2 puffs BID  •  Cholecalciferol (VITAMIN D-3) 1000 UNITS QD  •  fluticasone (FLONASE) 50 MCG/ACT nasal spray 2 spr/nostril QD  •  Naproxen Sodium (ALEVE) 220 MG BID prn  •  predniSONE (DELTASONE) 10 MG tablet taper (10mg QD x 3d left)      VITALS:  /64   Pulse 57   Ht 179.1 cm (70.5\")   Wt 76.2 kg (168 lb)   SpO2 98%   BMI 23.76 kg/m²     Physical Exam   Constitutional: She is oriented to person, place, and time. She appears well-developed and well-nourished. No distress.   HENT:   Mouth/Throat: No oropharyngeal exudate.   Eyes: Conjunctivae and EOM are normal.   Cardiovascular: Normal rate, regular rhythm and normal heart sounds.   Pulmonary/Chest: Effort normal and breath sounds normal. No respiratory distress. She has no wheezes. She has no rales.   Speaks in complete sentences; minimal whistlina noises at very end expiration BLL laterally   Abdominal: Soft. Bowel sounds are normal.   Musculoskeletal:   Normal gait "   Neurological: She is alert and oriented to person, place, and time.   Psychiatric: She has a normal mood and affect. Her behavior is normal.   Nursing note and vitals reviewed.      LABS  2/25/20 A1C 5.9, TSH 4.36    ASSESSMENT/PLAN  Problem List Items Addressed This Visit     Mild persistent asthma without complication - Primary     Significantly improved s/p course of augmentin and near end of pred taper; remains on symbicort 80/4.5 2 puffs BID; expect poss mild residual cough over the next few wks; if does not cont to improve over next 2-3 wks, f/u for PFTs to eval whether we should increase dose of symbicort.           Allergic rhinitis     Cont with flonase and antihistamine as the seasons change           Other Visit Diagnoses     Abnormal results of thyroid function studies        states she was taking biotin; instructed patient to hold x 1 week prior to next labs in 9/2020          FOLLOW-UP  RTC for PFTs and f/u if does not cont to improve over the next 1-3 wks; otherwise next f/u 9/8/20 with A1C, TFTs    Electronically signed by:    Alia Limon MD, FACP  03/17/2020

## 2020-03-17 NOTE — ASSESSMENT & PLAN NOTE
Significantly improved s/p course of augmentin and near end of pred taper; remains on symbicort 80/4.5 2 puffs BID; expect poss mild residual cough over the next few wks; if does not cont to improve over next 2-3 wks, f/u for PFTs to eval whether we should increase dose of symbicort.

## 2020-08-06 DIAGNOSIS — J45.41 MODERATE PERSISTENT ASTHMA WITH ACUTE EXACERBATION: ICD-10-CM

## 2020-08-06 RX ORDER — BUDESONIDE AND FORMOTEROL FUMARATE DIHYDRATE 80; 4.5 UG/1; UG/1
2 AEROSOL RESPIRATORY (INHALATION)
Qty: 1 INHALER | Refills: 0 | Status: SHIPPED | OUTPATIENT
Start: 2020-08-06 | End: 2020-09-08 | Stop reason: SDUPTHER

## 2020-09-08 ENCOUNTER — LAB (OUTPATIENT)
Dept: LAB | Facility: HOSPITAL | Age: 60
End: 2020-09-08

## 2020-09-08 ENCOUNTER — OFFICE VISIT (OUTPATIENT)
Dept: INTERNAL MEDICINE | Facility: CLINIC | Age: 60
End: 2020-09-08

## 2020-09-08 VITALS
HEIGHT: 71 IN | SYSTOLIC BLOOD PRESSURE: 110 MMHG | WEIGHT: 168 LBS | HEART RATE: 54 BPM | DIASTOLIC BLOOD PRESSURE: 68 MMHG | BODY MASS INDEX: 23.52 KG/M2 | OXYGEN SATURATION: 98 %

## 2020-09-08 DIAGNOSIS — L60.8 CHANGE IN NAIL APPEARANCE: ICD-10-CM

## 2020-09-08 DIAGNOSIS — Z00.00 ROUTINE HEALTH MAINTENANCE: ICD-10-CM

## 2020-09-08 DIAGNOSIS — J45.30 MILD PERSISTENT ASTHMA WITHOUT COMPLICATION: ICD-10-CM

## 2020-09-08 DIAGNOSIS — E06.1 SUBACUTE THYROIDITIS: ICD-10-CM

## 2020-09-08 DIAGNOSIS — R73.01 IMPAIRED FASTING GLUCOSE: Primary | ICD-10-CM

## 2020-09-08 LAB — HBA1C MFR BLD: 5.8 %

## 2020-09-08 PROCEDURE — 83036 HEMOGLOBIN GLYCOSYLATED A1C: CPT | Performed by: INTERNAL MEDICINE

## 2020-09-08 PROCEDURE — 99214 OFFICE O/P EST MOD 30 MIN: CPT | Performed by: INTERNAL MEDICINE

## 2020-09-08 PROCEDURE — 90472 IMMUNIZATION ADMIN EACH ADD: CPT | Performed by: INTERNAL MEDICINE

## 2020-09-08 PROCEDURE — 84443 ASSAY THYROID STIM HORMONE: CPT | Performed by: INTERNAL MEDICINE

## 2020-09-08 PROCEDURE — 90686 IIV4 VACC NO PRSV 0.5 ML IM: CPT | Performed by: INTERNAL MEDICINE

## 2020-09-08 PROCEDURE — 90750 HZV VACC RECOMBINANT IM: CPT | Performed by: INTERNAL MEDICINE

## 2020-09-08 PROCEDURE — 84439 ASSAY OF FREE THYROXINE: CPT | Performed by: INTERNAL MEDICINE

## 2020-09-08 PROCEDURE — 90471 IMMUNIZATION ADMIN: CPT | Performed by: INTERNAL MEDICINE

## 2020-09-08 RX ORDER — BUDESONIDE AND FORMOTEROL FUMARATE DIHYDRATE 80; 4.5 UG/1; UG/1
2 AEROSOL RESPIRATORY (INHALATION)
Qty: 3 INHALER | Refills: 1 | Status: SHIPPED | OUTPATIENT
Start: 2020-09-08 | End: 2021-04-05 | Stop reason: SDUPTHER

## 2020-09-08 NOTE — ASSESSMENT & PLAN NOTE
BG control stable with A1c 5.8; encouraged reg phys activity to decr insulin resistance, moderation in unhealthy starches/sweets; f/u A1C in 6 mos

## 2020-09-08 NOTE — PROGRESS NOTES
"Chief Complaint   Patient presents with   • Imapired fasting glucose       History of Present Illness  60 y.o.  woman presents for sugar follow-up. States breathing has been stable; usually starts having resp flare-ups around this time of year and into October. Doing well on inhaler; is gargling afterwards. Denies SOB or wheezing.    Has not taken biotin for the last month. Has noticed nails start chipping more.    Review of Systems  ROS significant for no shortness of breath, cough, wheezing, fevers, chills.  Denies any chest pain or lightheadedness.  ROS (+) for increased chipped nails off of biotin. All other ROS reviewed and negative.    [place]  The following portions of the patient's history were reviewed and updated as appropriate: allergies, current medications, past family history, past medical history, past social history, past surgical history and problem list.  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 She appears well-developed and well-nourished.   Eyes: Conjunctivae and EOM are normal.   Cardiovascular: Normal rate, regular rhythm and normal heart sounds.   Pulmonary/Chest: Effort normal and breath sounds normal. No respiratory distress. She has no wheezes. She has no rales.   Musculoskeletal:   Normal gait   Neurological: She is alert and oriented to person, place, and time.   Psychiatric: She has a normal mood and affect. Her behavior is normal. "   Nursing note and vitals reviewed.      LABS  Results for orders placed or performed in visit on 09/08/20   POC Glycosylated Hemoglobin (Hb A1C)   Result Value Ref Range    Hemoglobin A1C 5.8 %     2/20 A1C 5.9    ASSESSMENT/PLAN  Problem List Items Addressed This Visit        Respiratory    Mild persistent asthma without complication     Unable to update PFTs with addition of Symbicort 80/4.52 puffs BID #3, 1RF due to COVID-19 restrictions but her respiratory status has been stable; plan to try to update PFTs with next PE in 6 mos; f/u in the interim if she has exacerbation this winter season         Relevant Medications    budesonide-formoterol (Symbicort) 80-4.5 MCG/ACT inhaler       Endocrine    Impaired fasting glucose - Primary     BG control stable with A1c 5.8; encouraged reg phys activity to decr insulin resistance, moderation in unhealthy starches/sweets; f/u A1C in 6 mos           Relevant Orders    POC Glycosylated Hemoglobin (Hb A1C) (Completed)       Musculoskeletal and Integument    Change in nail appearance     Reports increased chips now is off of biotin; recommend that she resume biotin but stopped that 1 week prior to labs so that it does not interfere with the lab assays, such as TSH           Other Visit Diagnoses     Routine health maintenance        Relevant Orders    Shingrix Vaccine (Completed)    FluLaval Quad >6 Months ([id number]) (Completed)          FOLLOW-UP  1.  Health maintenance  -flu vaccine and Shingrix  #2 given today; counseling given regarding Shingrix and potential side effects  2.  RTC for PE 3/5/21; fasting labs wk prior to appt (CBC, CMP, TSH, lipids, UA/micr, A1C, vit D, CPK); also PFTs +shingrix #2 at that time      Electronically signed by:    Alia Limon MD, FACP  09/08/2020    EMR Dragon/Transcription disclaimer:  Parts of this encounter note is an electronic transcription/translation of spoken language to printed text. Electronic translation of spoken language may  permit erroneous, or at times, nonsensical words or phrases, to be inadvertently transcribed. Although I have reviewed the note for such errors, some may still exist.

## 2020-09-08 NOTE — ASSESSMENT & PLAN NOTE
Unable to update PFTs with addition of Symbicort 80/4.52 puffs BID #3, 1RF due to COVID-19 restrictions but her respiratory status has been stable; plan to try to update PFTs with next PE in 6 mos; f/u in the interim if she has exacerbation this winter season

## 2020-09-08 NOTE — ASSESSMENT & PLAN NOTE
Reports increased chips now is off of biotin; recommend that she resume biotin but stopped that 1 week prior to labs so that it does not interfere with the lab assays, such as TSH

## 2020-09-09 LAB
T4 FREE SERPL-MCNC: 1.18 NG/DL (ref 0.93–1.7)
TSH SERPL DL<=0.05 MIU/L-ACNC: 2.02 UIU/ML (ref 0.27–4.2)

## 2020-09-10 ENCOUNTER — TELEPHONE (OUTPATIENT)
Dept: INTERNAL MEDICINE | Facility: CLINIC | Age: 60
End: 2020-09-10

## 2020-09-10 NOTE — TELEPHONE ENCOUNTER
----- Message from Alia Limon MD sent at 9/9/2020 12:30 PM EDT -----  Thyroid hormone levels normal; will repeat again with next physical

## 2021-01-28 ENCOUNTER — TRANSCRIBE ORDERS (OUTPATIENT)
Dept: INTERNAL MEDICINE | Facility: CLINIC | Age: 61
End: 2021-01-28

## 2021-01-28 DIAGNOSIS — Z12.31 VISIT FOR SCREENING MAMMOGRAM: Primary | ICD-10-CM

## 2021-02-25 ENCOUNTER — TELEPHONE (OUTPATIENT)
Dept: INTERNAL MEDICINE | Facility: CLINIC | Age: 61
End: 2021-02-25

## 2021-02-25 DIAGNOSIS — E55.9 VITAMIN D DEFICIENCY: ICD-10-CM

## 2021-02-25 DIAGNOSIS — R73.01 IMPAIRED FASTING GLUCOSE: ICD-10-CM

## 2021-02-25 DIAGNOSIS — E78.00 PURE HYPERCHOLESTEROLEMIA: ICD-10-CM

## 2021-02-25 DIAGNOSIS — Z00.00 PE (PHYSICAL EXAM), ROUTINE: Primary | ICD-10-CM

## 2021-03-01 DIAGNOSIS — E78.00 PURE HYPERCHOLESTEROLEMIA: ICD-10-CM

## 2021-03-01 RX ORDER — ATORVASTATIN CALCIUM 10 MG/1
TABLET, FILM COATED ORAL
Qty: 90 TABLET | Refills: 2 | OUTPATIENT
Start: 2021-03-01

## 2021-03-05 ENCOUNTER — PATIENT MESSAGE (OUTPATIENT)
Dept: INTERNAL MEDICINE | Facility: CLINIC | Age: 61
End: 2021-03-05

## 2021-03-05 DIAGNOSIS — E78.00 PURE HYPERCHOLESTEROLEMIA: ICD-10-CM

## 2021-03-05 RX ORDER — ATORVASTATIN CALCIUM 10 MG/1
10 TABLET, FILM COATED ORAL NIGHTLY
Qty: 30 TABLET | Refills: 0 | Status: SHIPPED | OUTPATIENT
Start: 2021-03-05 | End: 2021-04-05 | Stop reason: SDUPTHER

## 2021-03-26 ENCOUNTER — HOSPITAL ENCOUNTER (OUTPATIENT)
Dept: MAMMOGRAPHY | Facility: HOSPITAL | Age: 61
Discharge: HOME | End: 2021-03-26
Admitting: INTERNAL MEDICINE

## 2021-03-26 DIAGNOSIS — Z12.31 VISIT FOR SCREENING MAMMOGRAM: ICD-10-CM

## 2021-03-26 PROCEDURE — 77067 SCR MAMMO BI INCL CAD: CPT

## 2021-03-26 PROCEDURE — 77063 BREAST TOMOSYNTHESIS BI: CPT | Performed by: RADIOLOGY

## 2021-03-26 PROCEDURE — 77067 SCR MAMMO BI INCL CAD: CPT | Performed by: RADIOLOGY

## 2021-03-26 PROCEDURE — 77063 BREAST TOMOSYNTHESIS BI: CPT

## 2021-03-29 ENCOUNTER — LAB (OUTPATIENT)
Dept: LAB | Facility: HOSPITAL | Age: 61
End: 2021-03-29

## 2021-03-29 DIAGNOSIS — E55.9 VITAMIN D DEFICIENCY: ICD-10-CM

## 2021-03-29 DIAGNOSIS — E78.00 PURE HYPERCHOLESTEROLEMIA: ICD-10-CM

## 2021-03-29 DIAGNOSIS — Z00.00 PE (PHYSICAL EXAM), ROUTINE: ICD-10-CM

## 2021-03-29 DIAGNOSIS — R73.01 IMPAIRED FASTING GLUCOSE: ICD-10-CM

## 2021-03-29 LAB
25(OH)D3 SERPL-MCNC: 40.3 NG/ML
ALBUMIN SERPL-MCNC: 4.4 G/DL (ref 3.5–5.2)
ALBUMIN/GLOB SERPL: 1.8 G/DL
ALP SERPL-CCNC: 50 U/L (ref 39–117)
ALT SERPL W P-5'-P-CCNC: 43 U/L (ref 1–33)
ANION GAP SERPL CALCULATED.3IONS-SCNC: 10.1 MMOL/L (ref 5–15)
AST SERPL-CCNC: 43 U/L (ref 1–32)
BACTERIA UR QL AUTO: NORMAL /HPF
BASOPHILS # BLD AUTO: 0.06 10*3/MM3 (ref 0–0.2)
BASOPHILS NFR BLD AUTO: 0.9 % (ref 0–1.5)
BILIRUB SERPL-MCNC: 0.6 MG/DL (ref 0–1.2)
BILIRUB UR QL STRIP: NEGATIVE
BUN SERPL-MCNC: 13 MG/DL (ref 8–23)
BUN/CREAT SERPL: 16.3 (ref 7–25)
CALCIUM SPEC-SCNC: 9.1 MG/DL (ref 8.6–10.5)
CHLORIDE SERPL-SCNC: 99 MMOL/L (ref 98–107)
CHOLEST SERPL-MCNC: 162 MG/DL (ref 0–200)
CK SERPL-CCNC: 237 U/L (ref 20–180)
CLARITY UR: CLEAR
CO2 SERPL-SCNC: 24.9 MMOL/L (ref 22–29)
COLOR UR: YELLOW
CREAT SERPL-MCNC: 0.8 MG/DL (ref 0.57–1)
DEPRECATED RDW RBC AUTO: 44.7 FL (ref 37–54)
EOSINOPHIL # BLD AUTO: 0.4 10*3/MM3 (ref 0–0.4)
EOSINOPHIL NFR BLD AUTO: 5.9 % (ref 0.3–6.2)
ERYTHROCYTE [DISTWIDTH] IN BLOOD BY AUTOMATED COUNT: 13.2 % (ref 12.3–15.4)
GFR SERPL CREATININE-BSD FRML MDRD: 73 ML/MIN/1.73
GLOBULIN UR ELPH-MCNC: 2.5 GM/DL
GLUCOSE SERPL-MCNC: 83 MG/DL (ref 65–99)
GLUCOSE UR STRIP-MCNC: NEGATIVE MG/DL
HBA1C MFR BLD: 5.77 % (ref 4.8–5.6)
HCT VFR BLD AUTO: 38.9 % (ref 34–46.6)
HDLC SERPL-MCNC: 89 MG/DL (ref 40–60)
HGB BLD-MCNC: 12.5 G/DL (ref 12–15.9)
HGB UR QL STRIP.AUTO: NEGATIVE
HYALINE CASTS UR QL AUTO: NORMAL /LPF
IMM GRANULOCYTES # BLD AUTO: 0.01 10*3/MM3 (ref 0–0.05)
IMM GRANULOCYTES NFR BLD AUTO: 0.1 % (ref 0–0.5)
KETONES UR QL STRIP: NEGATIVE
LDLC SERPL CALC-MCNC: 61 MG/DL (ref 0–100)
LDLC/HDLC SERPL: 0.68 {RATIO}
LEUKOCYTE ESTERASE UR QL STRIP.AUTO: NEGATIVE
LYMPHOCYTES # BLD AUTO: 1.54 10*3/MM3 (ref 0.7–3.1)
LYMPHOCYTES NFR BLD AUTO: 22.6 % (ref 19.6–45.3)
MCH RBC QN AUTO: 29.5 PG (ref 26.6–33)
MCHC RBC AUTO-ENTMCNC: 32.1 G/DL (ref 31.5–35.7)
MCV RBC AUTO: 91.7 FL (ref 79–97)
MONOCYTES # BLD AUTO: 0.63 10*3/MM3 (ref 0.1–0.9)
MONOCYTES NFR BLD AUTO: 9.2 % (ref 5–12)
NEUTROPHILS NFR BLD AUTO: 4.18 10*3/MM3 (ref 1.7–7)
NEUTROPHILS NFR BLD AUTO: 61.3 % (ref 42.7–76)
NITRITE UR QL STRIP: NEGATIVE
NRBC BLD AUTO-RTO: 0 /100 WBC (ref 0–0.2)
PH UR STRIP.AUTO: 7.5 [PH] (ref 5–8)
PLATELET # BLD AUTO: 314 10*3/MM3 (ref 140–450)
PMV BLD AUTO: 9.8 FL (ref 6–12)
POTASSIUM SERPL-SCNC: 4.3 MMOL/L (ref 3.5–5.2)
PROT SERPL-MCNC: 6.9 G/DL (ref 6–8.5)
PROT UR QL STRIP: NEGATIVE
RBC # BLD AUTO: 4.24 10*6/MM3 (ref 3.77–5.28)
RBC # UR: NORMAL /HPF
REF LAB TEST METHOD: NORMAL
SODIUM SERPL-SCNC: 134 MMOL/L (ref 136–145)
SP GR UR STRIP: 1.01 (ref 1–1.03)
SQUAMOUS #/AREA URNS HPF: NORMAL /HPF
TRIGL SERPL-MCNC: 63 MG/DL (ref 0–150)
TSH SERPL DL<=0.05 MIU/L-ACNC: 2.42 UIU/ML (ref 0.27–4.2)
UROBILINOGEN UR QL STRIP: NORMAL
VLDLC SERPL-MCNC: 12 MG/DL (ref 5–40)
WBC # BLD AUTO: 6.82 10*3/MM3 (ref 3.4–10.8)
WBC UR QL AUTO: NORMAL /HPF

## 2021-03-29 PROCEDURE — 85025 COMPLETE CBC W/AUTO DIFF WBC: CPT

## 2021-03-29 PROCEDURE — 80061 LIPID PANEL: CPT

## 2021-03-29 PROCEDURE — 84443 ASSAY THYROID STIM HORMONE: CPT

## 2021-03-29 PROCEDURE — 82550 ASSAY OF CK (CPK): CPT

## 2021-03-29 PROCEDURE — 81001 URINALYSIS AUTO W/SCOPE: CPT

## 2021-03-29 PROCEDURE — 83036 HEMOGLOBIN GLYCOSYLATED A1C: CPT

## 2021-03-29 PROCEDURE — 80053 COMPREHEN METABOLIC PANEL: CPT

## 2021-03-29 PROCEDURE — 82306 VITAMIN D 25 HYDROXY: CPT

## 2021-04-05 ENCOUNTER — OFFICE VISIT (OUTPATIENT)
Dept: INTERNAL MEDICINE | Facility: CLINIC | Age: 61
End: 2021-04-05

## 2021-04-05 VITALS
WEIGHT: 171 LBS | HEIGHT: 71 IN | OXYGEN SATURATION: 98 % | SYSTOLIC BLOOD PRESSURE: 124 MMHG | HEART RATE: 66 BPM | DIASTOLIC BLOOD PRESSURE: 68 MMHG | BODY MASS INDEX: 23.94 KG/M2

## 2021-04-05 DIAGNOSIS — M85.89 OSTEOPENIA OF MULTIPLE SITES: Chronic | ICD-10-CM

## 2021-04-05 DIAGNOSIS — J45.30 MILD PERSISTENT ASTHMA WITHOUT COMPLICATION: ICD-10-CM

## 2021-04-05 DIAGNOSIS — M17.0 PRIMARY OSTEOARTHRITIS OF BOTH KNEES: Chronic | ICD-10-CM

## 2021-04-05 DIAGNOSIS — Z00.00 PE (PHYSICAL EXAM), ROUTINE: Primary | ICD-10-CM

## 2021-04-05 DIAGNOSIS — E06.1 SUBACUTE THYROIDITIS: Chronic | ICD-10-CM

## 2021-04-05 DIAGNOSIS — R73.01 IMPAIRED FASTING GLUCOSE: ICD-10-CM

## 2021-04-05 DIAGNOSIS — E78.00 PURE HYPERCHOLESTEROLEMIA: ICD-10-CM

## 2021-04-05 DIAGNOSIS — E55.9 VITAMIN D DEFICIENCY: Chronic | ICD-10-CM

## 2021-04-05 DIAGNOSIS — Z78.0 MENOPAUSE: Chronic | ICD-10-CM

## 2021-04-05 PROCEDURE — 93000 ELECTROCARDIOGRAM COMPLETE: CPT | Performed by: INTERNAL MEDICINE

## 2021-04-05 PROCEDURE — 90471 IMMUNIZATION ADMIN: CPT | Performed by: INTERNAL MEDICINE

## 2021-04-05 PROCEDURE — 90750 HZV VACC RECOMBINANT IM: CPT | Performed by: INTERNAL MEDICINE

## 2021-04-05 PROCEDURE — 99396 PREV VISIT EST AGE 40-64: CPT | Performed by: INTERNAL MEDICINE

## 2021-04-05 RX ORDER — BUDESONIDE AND FORMOTEROL FUMARATE DIHYDRATE 80; 4.5 UG/1; UG/1
2 AEROSOL RESPIRATORY (INHALATION)
Qty: 3 EACH | Refills: 3 | Status: SHIPPED | OUTPATIENT
Start: 2021-04-05 | End: 2022-05-14 | Stop reason: SDUPTHER

## 2021-04-05 RX ORDER — ATORVASTATIN CALCIUM 10 MG/1
10 TABLET, FILM COATED ORAL NIGHTLY
Qty: 90 TABLET | Refills: 3 | Status: SHIPPED | OUTPATIENT
Start: 2021-04-05 | End: 2022-05-14 | Stop reason: SDUPTHER

## 2021-04-05 NOTE — PROGRESS NOTES
"Chief Complaint   Patient presents with   • Annual Exam   • Hyperlipidemia   • Knee Pain       History of Present Illness  60 y.o.  woman presents for updated phys examination. Reports no asthma flare-ups; stable on current inhaler.    Notes progressively worsening bilat knee pain R>L with some assoc'd weakness; no falls so far. Not taking anything consistently for pain.    Had to teach band via zoom.  Admitted to increased phys activity/lifting shortly before labs b/x of having to frequently move instruments for in-person school instruction.    Review of Systems  Denies headaches, visual changes, CP, palpitations, SOB, cough, abd pain, n/v/d, difficulty with urination, numbness/tingling, falls, mood changes, lightheadedness, hearing changes, rashes.    ROS (+) for bilat knee pain with some weakness as noted.    Denies vaginal discharge or bleeding (no periods) or breast concerns.   All other ROS reviewed and negative.    Current Outpatient Medications:   •  albuterol sulfate HFA (PROAIR HFA) 108 (90 Base) MCG/ACT inhaler prn  •  atorvastatin (LIPITOR) 10 MG QD  •  budesonide-formoterol (Symbicort) 80-4.5 MCG/ACT inhaler  2 puffs BID  •  Cholecalciferol (VITAMIN D-3) 1000 UNITS QD  •  fluticasone (FLONASE) 50 MCG/ACT nasal spray AD  •  Naproxen Sodium (ALEVE) 220 MG bid prn      VITALS:  /68   Pulse 66   Ht 179.1 cm (70.5\")   Wt 77.6 kg (171 lb)   SpO2 98%   BMI 24.19 kg/m²     Physical Exam  Vitals and nursing note reviewed.   Constitutional:       General: She is not in acute distress.     Appearance: Normal appearance. She is well-developed.   HENT:      Head: Normocephalic.      Right Ear: Tympanic membrane, ear canal and external ear normal. There is no impacted cerumen.      Ears:      Comments: Left ear with closed pinpoint opening, no erythema, absent hearing     Nose: Nose normal.   Eyes:      Extraocular Movements: Extraocular movements intact.      Conjunctiva/sclera: Conjunctivae " normal.      Pupils: Pupils are equal, round, and reactive to light.   Neck:      Vascular: No carotid bruit (bilaterally).   Cardiovascular:      Rate and Rhythm: Normal rate and regular rhythm.      Heart sounds: Normal heart sounds.   Pulmonary:      Effort: Pulmonary effort is normal. No respiratory distress.      Breath sounds: Normal breath sounds. No wheezing or rales.   Abdominal:      General: Bowel sounds are normal. There is no distension.      Palpations: Abdomen is soft. There is no mass.      Tenderness: There is no abdominal tenderness.   Genitourinary:     Comments: Chaperone declined by patient.    Breast exam unremarkable without masses, skin changes, nipple discharge, or axillary adenopathy.    Musculoskeletal:      Cervical back: Normal range of motion and neck supple.      Comments: bilat knees FROM without significant swelling; no erythema or increased warmth   Lymphadenopathy:      Cervical: No cervical adenopathy.   Skin:     General: Skin is warm and dry.      Findings: No rash.   Neurological:      Mental Status: She is alert and oriented to person, place, and time.      Cranial Nerves: No cranial nerve deficit.      Gait: Gait normal.      Deep Tendon Reflexes: Reflexes normal.   Psychiatric:         Mood and Affect: Mood normal.         Behavior: Behavior normal.           LABS  Results for orders placed or performed in visit on 03/29/21   Comprehensive Metabolic Panel    Specimen: Blood   Result Value Ref Range    Glucose 83 65 - 99 mg/dL    BUN 13 8 - 23 mg/dL    Creatinine 0.80 0.57 - 1.00 mg/dL    Sodium 134 (L) 136 - 145 mmol/L    Potassium 4.3 3.5 - 5.2 mmol/L    Chloride 99 98 - 107 mmol/L    CO2 24.9 22.0 - 29.0 mmol/L    Calcium 9.1 8.6 - 10.5 mg/dL    Total Protein 6.9 6.0 - 8.5 g/dL    Albumin 4.40 3.50 - 5.20 g/dL    ALT (SGPT) 43 (H) 1 - 33 U/L    AST (SGOT) 43 (H) 1 - 32 U/L    Alkaline Phosphatase 50 39 - 117 U/L    Total Bilirubin 0.6 0.0 - 1.2 mg/dL    eGFR Non African  Amer 73 >60 mL/min/1.73    Globulin 2.5 gm/dL    A/G Ratio 1.8 g/dL    BUN/Creatinine Ratio 16.3 7.0 - 25.0    Anion Gap 10.1 5.0 - 15.0 mmol/L   Lipid Panel    Specimen: Blood   Result Value Ref Range    Total Cholesterol 162 0 - 200 mg/dL    Triglycerides 63 0 - 150 mg/dL    HDL Cholesterol 89 (H) 40 - 60 mg/dL    LDL Cholesterol  61 0 - 100 mg/dL    VLDL Cholesterol 12 5 - 40 mg/dL    LDL/HDL Ratio 0.68    TSH    Specimen: Blood   Result Value Ref Range    TSH 2.420 0.270 - 4.200 uIU/mL   Hemoglobin A1c    Specimen: Blood   Result Value Ref Range    Hemoglobin A1C 5.77 (H) 4.80 - 5.60 %   Vitamin D 25 Hydroxy    Specimen: Blood   Result Value Ref Range    25 Hydroxy, Vitamin D 40.3 ng/ml   CK    Specimen: Blood   Result Value Ref Range    Creatine Kinase 237 (H) 20 - 180 U/L   CBC Auto Differential    Specimen: Blood   Result Value Ref Range    WBC 6.82 3.40 - 10.80 10*3/mm3    RBC 4.24 3.77 - 5.28 10*6/mm3    Hemoglobin 12.5 12.0 - 15.9 g/dL    Hematocrit 38.9 34.0 - 46.6 %    MCV 91.7 79.0 - 97.0 fL    MCH 29.5 26.6 - 33.0 pg    MCHC 32.1 31.5 - 35.7 g/dL    RDW 13.2 12.3 - 15.4 %    RDW-SD 44.7 37.0 - 54.0 fl    MPV 9.8 6.0 - 12.0 fL    Platelets 314 140 - 450 10*3/mm3    Neutrophil % 61.3 42.7 - 76.0 %    Lymphocyte % 22.6 19.6 - 45.3 %    Monocyte % 9.2 5.0 - 12.0 %    Eosinophil % 5.9 0.3 - 6.2 %    Basophil % 0.9 0.0 - 1.5 %    Immature Grans % 0.1 0.0 - 0.5 %    Neutrophils, Absolute 4.18 1.70 - 7.00 10*3/mm3    Lymphocytes, Absolute 1.54 0.70 - 3.10 10*3/mm3    Monocytes, Absolute 0.63 0.10 - 0.90 10*3/mm3    Eosinophils, Absolute 0.40 0.00 - 0.40 10*3/mm3    Basophils, Absolute 0.06 0.00 - 0.20 10*3/mm3    Immature Grans, Absolute 0.01 0.00 - 0.05 10*3/mm3    nRBC 0.0 0.0 - 0.2 /100 WBC   Urinalysis without microscopic (no culture) - Urine, Clean Catch    Specimen: Urine, Clean Catch   Result Value Ref Range    Color, UA Yellow Yellow, Straw    Appearance, UA Clear Clear    pH, UA 7.5 5.0 - 8.0     Specific Gravity, UA 1.012 1.005 - 1.030    Glucose, UA Negative Negative    Ketones, UA Negative Negative    Bilirubin, UA Negative Negative    Blood, UA Negative Negative    Protein, UA Negative Negative    Leuk Esterase, UA Negative Negative    Nitrite, UA Negative Negative    Urobilinogen, UA 0.2 E.U./dL 0.2 - 1.0 E.U./dL   Urinalysis, Microscopic Only - Urine, Clean Catch    Specimen: Urine, Clean Catch   Result Value Ref Range    RBC, UA 0-2 None Seen, 0-2 /HPF    WBC, UA 0-2 None Seen, 0-2 /HPF    Bacteria, UA None Seen None Seen /HPF    Squamous Epithelial Cells, UA 0-2 None Seen, 0-2 /HPF    Hyaline Casts, UA None Seen None Seen /LPF    Methodology Automated Microscopy      9/20 A1C 5.8    ECG 12 Lead    Date/Time: 4/5/2021 10:30 AM  Performed by: Alia Limon MD  Authorized by: Alia Limon MD   Comparison: compared with previous ECG from 3/2/2020  Similar to previous ECG  Rhythm: sinus rhythm and sinus bradycardia  Rate: normal  BPM: 55  Conduction: conduction normal  ST Segments: ST segments normal  T Waves: T waves normal  QRS axis: normal  Clinical impression comment: stable EKG              ASSESSMENT/PLAN    Diagnoses and all orders for this visit:    1. PE (physical exam), routine (Primary)  Assessment & Plan:  Health maintenance - flu 9/20; Td deferred today per pt - will update in 10/2021 (next Td due 2031); HAV done; Shingrix #2 given today; COVID19 vacc done; mammo 3/31/21; nl Pap 3/19, repeat 2022; DEXA due after 4/15/21; colonosc 7/25/17, repeat 2027 per Dr. Stewart; eye exam w/ Dr. Limon 2 wks ago; dental exam q6 mos; (+) seat belt use    Orders:  -     Shingrix Vaccine    2. Impaired fasting glucose  Assessment & Plan:  BG control stable with A1C 5.77; encouraged reg phys activity to decr insulin resistance, moderation in unhealthy starches/sweets; f/u A1C in 6 mos        3. Hyperlipidemia  Assessment & Plan:  Stable lipids with LDL 61; cont atorvastatin 10mg QD #90, 3RF    Orders:  -      atorvastatin (LIPITOR) 10 MG tablet; Take 1 tablet by mouth Every Night.  Dispense: 90 tablet; Refill: 3  -     ECG 12 Lead    4. Osteopenia  Assessment & Plan:  Calcium and vitamin D supplementation with weight-bearing exercise; DEXA ordered (due after 4/15/21)         5. Vitamin D deficiency  Assessment & Plan:  Stable vit D level 40.3 on 1000 units QD supplementation      6. Mild persistent asthma without complication  Assessment & Plan:  Unable to update PFTs due to COVID19 restrictions; stable on symbicort 50/4.5 2 puffs BID #3, 3RF      Orders:  -     budesonide-formoterol (Symbicort) 80-4.5 MCG/ACT inhaler; Inhale 2 puffs 2 (Two) Times a Day. Gargle and spit after puffs  Dispense: 3 each; Refill: 3    7. Subacute thyroiditis  Assessment & Plan:  Euthyroid TSH 2.42; no meds      8. Menopause  -     DEXA Bone Density Axial; Future    9. Osteoarthritis of both knees  Assessment & Plan:  rec quad/hamstring strengthening exercises, also buttock muscle strengthening exercises; rec 30d trial turmeric for anti-inflammatory effects; other alternative meds she could try includes gluc chondroitin sulfate or black cherry extract; discussed future potential need for injection therapy; not a surgical issue at this time        FOLLOW-UP   RTC 6 mos with A1C and Td vaccination    Electronically signed by:    Alia Limon MD, FACP  04/05/2021

## 2021-04-05 NOTE — ASSESSMENT & PLAN NOTE
Unable to update PFTs due to COVID19 restrictions; stable on symbicort 50/4.5 2 puffs BID #3, 3RF

## 2021-04-05 NOTE — ASSESSMENT & PLAN NOTE
Calcium and vitamin D supplementation with weight-bearing exercise; DEXA ordered (due after 4/15/21)

## 2021-04-05 NOTE — ASSESSMENT & PLAN NOTE
BG control stable with A1C 5.77; encouraged reg phys activity to decr insulin resistance, moderation in unhealthy starches/sweets; f/u A1C in 6 mos

## 2021-04-05 NOTE — ASSESSMENT & PLAN NOTE
Health maintenance - flu 9/20; Td deferred today per pt - will update in 10/2021 (next Td due 2031); HAV done; Shingrix #2 given today; COVID19 vacc done; mammo 3/31/21; nl Pap 3/19, repeat 2022; DEXA due after 4/15/21; colonosc 7/25/17, repeat 2027 per Dr. Stewart; eye exam w/ Dr. Limon 2 wks ago; dental exam q6 mos; (+) seat belt use

## 2021-04-06 NOTE — ASSESSMENT & PLAN NOTE
rec quad/hamstring strengthening exercises, also buttock muscle strengthening exercises; rec 30d trial turmeric for anti-inflammatory effects; other alternative meds she could try includes gluc chondroitin sulfate or black cherry extract; discussed future potential need for injection therapy; not a surgical issue at this time

## 2021-04-23 ENCOUNTER — HOSPITAL ENCOUNTER (OUTPATIENT)
Dept: BONE DENSITY | Facility: HOSPITAL | Age: 61
Discharge: HOME | End: 2021-04-23
Admitting: INTERNAL MEDICINE

## 2021-04-23 DIAGNOSIS — Z78.0 MENOPAUSE: Chronic | ICD-10-CM

## 2021-04-23 PROCEDURE — 77080 DXA BONE DENSITY AXIAL: CPT

## 2021-10-02 PROBLEM — Z00.00 PE (PHYSICAL EXAM), ROUTINE: Chronic | Status: ACTIVE | Noted: 2017-02-14

## 2021-10-03 NOTE — PROGRESS NOTES
Chief Complaint   Patient presents with   • Impaired fasting glucose       History of Present Illness  61 y.o.  woman presents for sugar follow-up. Reports inability to lose weight despite regular walking exercise. Has cut down on milk intake. Can walk up hills and on incline without getting SOB.    Review of Systems  Denies CP, palpitations, SOB, numbness/tingling. All other ROS reviewed and negative.      Current Outpatient Medications:   •  albuterol sulfate HFA (PROAIR HFA) 108 (90 Base) MCG/ACT inhaler prn  •  atorvastatin (LIPITOR) 10 MG QD  •  budesonide-formoterol (Symbicort) 80-4.5 MCG/ACT 2 puffs BID  •  Cholecalciferol (VITAMIN D-3) 1000 UNITS QD  •  fluticasone (FLONASE) 50 MCG/ACT nasal spray AD  •  Naproxen Sodium (ALEVE) 220 MG BID prn    VITALS:  /84   Pulse 55   Wt 76.2 kg (168 lb)   SpO2 94%   BMI 23.76 kg/m²     Physical Exam  Vitals and nursing note reviewed.   Constitutional:       General: She is not in acute distress.     Appearance: Normal appearance. She is not ill-appearing.   Eyes:      Extraocular Movements: Extraocular movements intact.      Conjunctiva/sclera: Conjunctivae normal.   Pulmonary:      Effort: Pulmonary effort is normal. No respiratory distress.   Neurological:      Mental Status: She is alert and oriented to person, place, and time. Mental status is at baseline.   Psychiatric:         Mood and Affect: Mood normal.         Behavior: Behavior normal.         LABS  Results for orders placed or performed in visit on 10/04/21   POC Glycosylated Hemoglobin (Hb A1C)    Specimen: Blood   Result Value Ref Range    Hemoglobin A1C 5.2 %     3/21 A1C 5.77    ASSESSMENT/PLAN    Diagnoses and all orders for this visit:    1. Impaired fasting glucose (Primary)  Assessment & Plan:  BG control improved with A1C 5.2; encouraged reg phys activity to decr insulin resistance, moderation in unhealthy starches/sweets; f/u A1C in 6 mos      Orders:  -     POC Glycosylated  Hemoglobin (Hb A1C)    2. 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 Need for influenza vaccination  -     FluLaval/Fluarix (VFC) >6 Months    4. Need for Td vaccine  -     Td Vaccine Greater Than or Equal To 6yo Preservative Free IM    5. Exercise counseling  Comments:  reviewed importance of intensity of exercise; monitor portions      FOLLOW-UP  1. 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 RTC for PE/Pap 4/8/22; fasting labs prior to appt (CBC, CMP, TSH, lipids, UA/micr, A1C, vit D, CPK    Electronically signed by:    Alia Limon MD, FACP  10/04/2021

## 2021-10-03 NOTE — ASSESSMENT & PLAN NOTE
BG control improved with A1C 5.2; encouraged reg phys activity to decr insulin resistance, moderation in unhealthy starches/sweets; f/u A1C in 6 mos

## 2021-10-04 ENCOUNTER — OFFICE VISIT (OUTPATIENT)
Dept: INTERNAL MEDICINE | Facility: CLINIC | Age: 61
End: 2021-10-04

## 2021-10-04 VITALS
SYSTOLIC BLOOD PRESSURE: 120 MMHG | WEIGHT: 168 LBS | BODY MASS INDEX: 23.76 KG/M2 | DIASTOLIC BLOOD PRESSURE: 84 MMHG | HEART RATE: 55 BPM | OXYGEN SATURATION: 94 %

## 2021-10-04 DIAGNOSIS — Z71.82 EXERCISE COUNSELING: ICD-10-CM

## 2021-10-04 DIAGNOSIS — Z23 NEED FOR TD VACCINE: ICD-10-CM

## 2021-10-04 DIAGNOSIS — Z23 NEED FOR INFLUENZA VACCINATION: ICD-10-CM

## 2021-10-04 DIAGNOSIS — R73.01 IMPAIRED FASTING GLUCOSE: Primary | Chronic | ICD-10-CM

## 2021-10-04 DIAGNOSIS — Z71.85 VACCINE COUNSELING: ICD-10-CM

## 2021-10-04 LAB — HBA1C MFR BLD: 5.2 %

## 2021-10-04 PROCEDURE — 90686 IIV4 VACC NO PRSV 0.5 ML IM: CPT | Performed by: INTERNAL MEDICINE

## 2021-10-04 PROCEDURE — 3044F HG A1C LEVEL LT 7.0%: CPT | Performed by: INTERNAL MEDICINE

## 2021-10-04 PROCEDURE — 90472 IMMUNIZATION ADMIN EACH ADD: CPT | Performed by: INTERNAL MEDICINE

## 2021-10-04 PROCEDURE — 90714 TD VACC NO PRESV 7 YRS+ IM: CPT | Performed by: INTERNAL MEDICINE

## 2021-10-04 PROCEDURE — 99213 OFFICE O/P EST LOW 20 MIN: CPT | Performed by: INTERNAL MEDICINE

## 2021-10-04 PROCEDURE — 83036 HEMOGLOBIN GLYCOSYLATED A1C: CPT | Performed by: INTERNAL MEDICINE

## 2021-10-04 PROCEDURE — 90471 IMMUNIZATION ADMIN: CPT | Performed by: INTERNAL MEDICINE

## 2021-10-04 NOTE — PROGRESS NOTES
Immunization  Immunization performed in left deltoid by Lula Roach MA. Patient tolerated the procedure well without complications.  10/04/21   Lula Roach MA    Answers for HPI/ROS submitted by the patient on 10/4/2021  Please describe your symptoms.: None.  This is a follow-up visit.  Have you had these symptoms before?: Yes  How long have you been having these symptoms?: Greater than 2 weeks  Please list any medications you are currently taking for this condition.: none  Please describe any probable cause for these symptoms. : This is a follow-up visit to check A1C and get a tetanus booster.  What is the primary reason for your visit?: Other

## 2021-10-04 NOTE — PROGRESS NOTES
Immunization  Immunization performed in right deltoid by Lula Roach MA. Patient tolerated the procedure well without complications.  10/04/21   Lula Roach MA  Answers for HPI/ROS submitted by the patient on 10/4/2021  Please describe your symptoms.: None.  This is a follow-up visit.  Have you had these symptoms before?: Yes  How long have you been having these symptoms?: Greater than 2 weeks  Please list any medications you are currently taking for this condition.: none  Please describe any probable cause for these symptoms. : This is a follow-up visit to check A1C and get a tetanus booster.  What is the primary reason for your visit?: Other

## 2022-03-18 ENCOUNTER — TELEPHONE (OUTPATIENT)
Dept: INTERNAL MEDICINE | Facility: CLINIC | Age: 62
End: 2022-03-18

## 2022-03-18 DIAGNOSIS — Z00.00 PE (PHYSICAL EXAM), ROUTINE: Primary | ICD-10-CM

## 2022-03-18 DIAGNOSIS — E55.9 VITAMIN D DEFICIENCY: ICD-10-CM

## 2022-03-18 DIAGNOSIS — R73.01 IMPAIRED FASTING GLUCOSE: ICD-10-CM

## 2022-03-18 DIAGNOSIS — E78.00 PURE HYPERCHOLESTEROLEMIA: ICD-10-CM

## 2022-05-03 ENCOUNTER — TRANSCRIBE ORDERS (OUTPATIENT)
Dept: ADMINISTRATIVE | Facility: HOSPITAL | Age: 62
End: 2022-05-03

## 2022-05-03 DIAGNOSIS — Z12.31 VISIT FOR SCREENING MAMMOGRAM: Primary | ICD-10-CM

## 2022-05-10 ENCOUNTER — LAB (OUTPATIENT)
Dept: LAB | Facility: HOSPITAL | Age: 62
End: 2022-05-10

## 2022-05-10 DIAGNOSIS — E78.00 PURE HYPERCHOLESTEROLEMIA: ICD-10-CM

## 2022-05-10 DIAGNOSIS — R73.01 IMPAIRED FASTING GLUCOSE: ICD-10-CM

## 2022-05-10 DIAGNOSIS — Z00.00 PE (PHYSICAL EXAM), ROUTINE: ICD-10-CM

## 2022-05-10 DIAGNOSIS — E55.9 VITAMIN D DEFICIENCY: ICD-10-CM

## 2022-05-10 LAB
25(OH)D3 SERPL-MCNC: 37.6 NG/ML (ref 30–100)
ALBUMIN SERPL-MCNC: 4.4 G/DL (ref 3.5–5.2)
ALBUMIN/GLOB SERPL: 1.5 G/DL
ALP SERPL-CCNC: 60 U/L (ref 39–117)
ALT SERPL W P-5'-P-CCNC: 29 U/L (ref 1–33)
ANION GAP SERPL CALCULATED.3IONS-SCNC: 12 MMOL/L (ref 5–15)
AST SERPL-CCNC: 28 U/L (ref 1–32)
BACTERIA UR QL AUTO: ABNORMAL /HPF
BASOPHILS # BLD AUTO: 0.09 10*3/MM3 (ref 0–0.2)
BASOPHILS NFR BLD AUTO: 1.4 % (ref 0–1.5)
BILIRUB SERPL-MCNC: 0.5 MG/DL (ref 0–1.2)
BILIRUB UR QL STRIP: NEGATIVE
BUN SERPL-MCNC: 15 MG/DL (ref 8–23)
BUN/CREAT SERPL: 17.9 (ref 7–25)
CALCIUM SPEC-SCNC: 9.4 MG/DL (ref 8.6–10.5)
CHLORIDE SERPL-SCNC: 102 MMOL/L (ref 98–107)
CHOLEST SERPL-MCNC: 219 MG/DL (ref 0–200)
CK SERPL-CCNC: 120 U/L (ref 20–180)
CLARITY UR: CLEAR
CO2 SERPL-SCNC: 25 MMOL/L (ref 22–29)
COLOR UR: YELLOW
CREAT SERPL-MCNC: 0.84 MG/DL (ref 0.57–1)
DEPRECATED RDW RBC AUTO: 44.1 FL (ref 37–54)
EGFRCR SERPLBLD CKD-EPI 2021: 78.2 ML/MIN/1.73
EOSINOPHIL # BLD AUTO: 0.58 10*3/MM3 (ref 0–0.4)
EOSINOPHIL NFR BLD AUTO: 9.2 % (ref 0.3–6.2)
ERYTHROCYTE [DISTWIDTH] IN BLOOD BY AUTOMATED COUNT: 12.9 % (ref 12.3–15.4)
GLOBULIN UR ELPH-MCNC: 2.9 GM/DL
GLUCOSE SERPL-MCNC: 85 MG/DL (ref 65–99)
GLUCOSE UR STRIP-MCNC: NEGATIVE MG/DL
HBA1C MFR BLD: 5.6 % (ref 4.8–5.6)
HCT VFR BLD AUTO: 40.8 % (ref 34–46.6)
HDLC SERPL-MCNC: 94 MG/DL (ref 40–60)
HGB BLD-MCNC: 13 G/DL (ref 12–15.9)
HGB UR QL STRIP.AUTO: NEGATIVE
HYALINE CASTS UR QL AUTO: ABNORMAL /LPF
IMM GRANULOCYTES # BLD AUTO: 0.02 10*3/MM3 (ref 0–0.05)
IMM GRANULOCYTES NFR BLD AUTO: 0.3 % (ref 0–0.5)
KETONES UR QL STRIP: NEGATIVE
LDLC SERPL CALC-MCNC: 110 MG/DL (ref 0–100)
LDLC/HDLC SERPL: 1.14 {RATIO}
LEUKOCYTE ESTERASE UR QL STRIP.AUTO: NEGATIVE
LYMPHOCYTES # BLD AUTO: 1.94 10*3/MM3 (ref 0.7–3.1)
LYMPHOCYTES NFR BLD AUTO: 30.6 % (ref 19.6–45.3)
MCH RBC QN AUTO: 29.5 PG (ref 26.6–33)
MCHC RBC AUTO-ENTMCNC: 31.9 G/DL (ref 31.5–35.7)
MCV RBC AUTO: 92.7 FL (ref 79–97)
MONOCYTES # BLD AUTO: 0.58 10*3/MM3 (ref 0.1–0.9)
MONOCYTES NFR BLD AUTO: 9.2 % (ref 5–12)
NEUTROPHILS NFR BLD AUTO: 3.12 10*3/MM3 (ref 1.7–7)
NEUTROPHILS NFR BLD AUTO: 49.3 % (ref 42.7–76)
NITRITE UR QL STRIP: NEGATIVE
NRBC BLD AUTO-RTO: 0 /100 WBC (ref 0–0.2)
PH UR STRIP.AUTO: 6.5 [PH] (ref 5–8)
PLATELET # BLD AUTO: 341 10*3/MM3 (ref 140–450)
PMV BLD AUTO: 9.9 FL (ref 6–12)
POTASSIUM SERPL-SCNC: 4.3 MMOL/L (ref 3.5–5.2)
PROT SERPL-MCNC: 7.3 G/DL (ref 6–8.5)
PROT UR QL STRIP: NEGATIVE
RBC # BLD AUTO: 4.4 10*6/MM3 (ref 3.77–5.28)
RBC # UR STRIP: ABNORMAL /HPF
REF LAB TEST METHOD: ABNORMAL
SODIUM SERPL-SCNC: 139 MMOL/L (ref 136–145)
SP GR UR STRIP: 1.02 (ref 1–1.03)
SQUAMOUS #/AREA URNS HPF: ABNORMAL /HPF
TRIGL SERPL-MCNC: 87 MG/DL (ref 0–150)
TSH SERPL DL<=0.05 MIU/L-ACNC: 4.75 UIU/ML (ref 0.27–4.2)
UROBILINOGEN UR QL STRIP: NORMAL
VLDLC SERPL-MCNC: 15 MG/DL (ref 5–40)
WBC # UR STRIP: ABNORMAL /HPF
WBC NRBC COR # BLD: 6.33 10*3/MM3 (ref 3.4–10.8)

## 2022-05-10 PROCEDURE — 82550 ASSAY OF CK (CPK): CPT

## 2022-05-10 PROCEDURE — 80050 GENERAL HEALTH PANEL: CPT

## 2022-05-10 PROCEDURE — 81001 URINALYSIS AUTO W/SCOPE: CPT

## 2022-05-10 PROCEDURE — 82306 VITAMIN D 25 HYDROXY: CPT

## 2022-05-10 PROCEDURE — 83036 HEMOGLOBIN GLYCOSYLATED A1C: CPT

## 2022-05-10 PROCEDURE — 80061 LIPID PANEL: CPT

## 2022-05-15 NOTE — ASSESSMENT & PLAN NOTE
Unable to update PFTs due to COVID19 pandemic restrictions; will resume symbicort 80/4.5 2 puffs BID #3, 3RF with prn alb; rec Prevnar 20 - agreeable but wants to wait until after mammo

## 2022-05-15 NOTE — ASSESSMENT & PLAN NOTE
Health maintenance - COVID19 vacc done, incl 3rd dose Pfizer; flu 10/21; PVX 3/20; rec Prevnar 20; Td 10/21 (next Td due 2031); HAV and Shingrix done; mammo 3/31/21, pending 5/19/22;breast/pelvic exam with Pap performed today (plan on no further Paps after this unless abnlities; DEXA 4/21, repeat 2023-24; colonosc 7/25/17, repeat 2027 per Dr. Stewart; eye exam pending per pt; dental exam q6 mos; (+) seat belt use

## 2022-05-15 NOTE — PROGRESS NOTES
"Chief Complaint   Patient presents with   • Annual Exam   • Gynecologic Exam       History of Present Illness  61 y.o.  woman presents for updated phys examination and f/u on BP, cholesterol, and sugars.    Ran out of Symbicort beginning of April and has been doing ok; has not gotten ill. Sometimes can feel sxs in her lungs but overall has been stable.    Review of Systems  Denies headaches, visual changes, CP, palpitations, SOB, cough, abd pain, n/v/d, difficulty with urination, numbness/tingling, falls, mood changes, lightheadedness, hearing changes, rashes.    Denies vaginal discharge or bleeding (no periods) or breast concerns.   All other ROS reviewed and negative.    Current Outpatient Medications:   •  albuterol sulfate HFA (PROAIR HFA) 108 (90 Base) MCG/ACT inhaler prn  •  atorvastatin (LIPITOR) 10 MG QD  •  budesonide-formoterol (Symbicort) 80-4.5 MCG/ACT 2 puffs BID  •  Cholecalciferol (VITAMIN D-3) 2000 UNITS QOD  •  fluticasone (FLONASE) 50 MCG/ACT nasal spray AD  •  Naproxen Sodium (ALEVE) 220 MG bid prn  •  Ca/vit D QD    VITALS:  /72   Pulse 54   Ht 177.8 cm (70\")   Wt 77.1 kg (170 lb)   SpO2 98%   BMI 24.39 kg/m²     Physical Exam  Vitals and nursing note reviewed.   Constitutional:       General: She is not in acute distress.     Appearance: Normal appearance. She is well-developed.   HENT:      Head: Normocephalic.      Right Ear: Tympanic membrane, ear canal and external ear normal.      Left Ear: Ear canal and external ear normal.      Ears:      Comments: Left ear chronically abnormal     Nose: Nose normal.   Eyes:      Extraocular Movements: Extraocular movements intact.      Conjunctiva/sclera: Conjunctivae normal.      Pupils: Pupils are equal, round, and reactive to light.   Neck:      Vascular: No carotid bruit (bilaterally).   Cardiovascular:      Rate and Rhythm: Normal rate and regular rhythm.      Heart sounds: Normal heart sounds.   Pulmonary:      Effort: Pulmonary " effort is normal. No respiratory distress.      Breath sounds: Normal breath sounds. No wheezing or rales.   Abdominal:      General: Bowel sounds are normal. There is no distension.      Palpations: Abdomen is soft. There is no mass.      Tenderness: There is no abdominal tenderness.   Genitourinary:     Comments: Chaperone declined by patient.    Breast exam unremarkable without masses, skin changes, nipple discharge, or axillary adenopathy.    Pelvic exam performed with normal external genitalia; normal urethral meatus and urethral exam; atrophic vagina mucosa, no discharge in the vaginal vault; no masses palpated/seen and no CMT or adnexal tenderness with palpation; no bladder abnormality noted; perineum intact.    Musculoskeletal:      Cervical back: Normal range of motion and neck supple.   Lymphadenopathy:      Cervical: No cervical adenopathy.   Skin:     General: Skin is warm and dry.      Findings: No rash.      Comments: Healed pink scar just inferior to the posterolateral knee at the upper part of the lower leg    Neurological:      Mental Status: She is alert and oriented to person, place, and time.      Cranial Nerves: No cranial nerve deficit.      Gait: Gait normal.      Deep Tendon Reflexes: Reflexes normal.   Psychiatric:         Mood and Affect: Mood normal.         Behavior: Behavior normal.           LABS  Results for orders placed or performed in visit on 05/10/22   Comprehensive Metabolic Panel    Specimen: Blood   Result Value Ref Range    Glucose 85 65 - 99 mg/dL    BUN 15 8 - 23 mg/dL    Creatinine 0.84 0.57 - 1.00 mg/dL    Sodium 139 136 - 145 mmol/L    Potassium 4.3 3.5 - 5.2 mmol/L    Chloride 102 98 - 107 mmol/L    CO2 25.0 22.0 - 29.0 mmol/L    Calcium 9.4 8.6 - 10.5 mg/dL    Total Protein 7.3 6.0 - 8.5 g/dL    Albumin 4.40 3.50 - 5.20 g/dL    ALT (SGPT) 29 1 - 33 U/L    AST (SGOT) 28 1 - 32 U/L    Alkaline Phosphatase 60 39 - 117 U/L    Total Bilirubin 0.5 0.0 - 1.2 mg/dL    Globulin  2.9 gm/dL    A/G Ratio 1.5 g/dL    BUN/Creatinine Ratio 17.9 7.0 - 25.0    Anion Gap 12.0 5.0 - 15.0 mmol/L    eGFR 78.2 >60.0 mL/min/1.73   Lipid Panel    Specimen: Blood   Result Value Ref Range    Total Cholesterol 219 (H) 0 - 200 mg/dL    Triglycerides 87 0 - 150 mg/dL    HDL Cholesterol 94 (H) 40 - 60 mg/dL    LDL Cholesterol  110 (H) 0 - 100 mg/dL    VLDL Cholesterol 15 5 - 40 mg/dL    LDL/HDL Ratio 1.14    TSH    Specimen: Blood   Result Value Ref Range    TSH 4.750 (H) 0.270 - 4.200 uIU/mL   Hemoglobin A1c    Specimen: Blood   Result Value Ref Range    Hemoglobin A1C 5.60 4.80 - 5.60 %   Vitamin D 25 Hydroxy    Specimen: Blood   Result Value Ref Range    25 Hydroxy, Vitamin D 37.6 30.0 - 100.0 ng/ml   CK    Specimen: Blood   Result Value Ref Range    Creatine Kinase 120 20 - 180 U/L   CBC Auto Differential    Specimen: Blood   Result Value Ref Range    WBC 6.33 3.40 - 10.80 10*3/mm3    RBC 4.40 3.77 - 5.28 10*6/mm3    Hemoglobin 13.0 12.0 - 15.9 g/dL    Hematocrit 40.8 34.0 - 46.6 %    MCV 92.7 79.0 - 97.0 fL    MCH 29.5 26.6 - 33.0 pg    MCHC 31.9 31.5 - 35.7 g/dL    RDW 12.9 12.3 - 15.4 %    RDW-SD 44.1 37.0 - 54.0 fl    MPV 9.9 6.0 - 12.0 fL    Platelets 341 140 - 450 10*3/mm3    Neutrophil % 49.3 42.7 - 76.0 %    Lymphocyte % 30.6 19.6 - 45.3 %    Monocyte % 9.2 5.0 - 12.0 %    Eosinophil % 9.2 (H) 0.3 - 6.2 %    Basophil % 1.4 0.0 - 1.5 %    Immature Grans % 0.3 0.0 - 0.5 %    Neutrophils, Absolute 3.12 1.70 - 7.00 10*3/mm3    Lymphocytes, Absolute 1.94 0.70 - 3.10 10*3/mm3    Monocytes, Absolute 0.58 0.10 - 0.90 10*3/mm3    Eosinophils, Absolute 0.58 (H) 0.00 - 0.40 10*3/mm3    Basophils, Absolute 0.09 0.00 - 0.20 10*3/mm3    Immature Grans, Absolute 0.02 0.00 - 0.05 10*3/mm3    nRBC 0.0 0.0 - 0.2 /100 WBC   Urinalysis without microscopic (no culture) - Urine, Clean Catch    Specimen: Urine, Clean Catch   Result Value Ref Range    Color, UA Yellow Yellow, Straw    Appearance, UA Clear Clear    pH, UA  6.5 5.0 - 8.0    Specific Gravity, UA 1.018 1.005 - 1.030    Glucose, UA Negative Negative    Ketones, UA Negative Negative    Bilirubin, UA Negative Negative    Blood, UA Negative Negative    Protein, UA Negative Negative    Leuk Esterase, UA Negative Negative    Nitrite, UA Negative Negative    Urobilinogen, UA 0.2 E.U./dL 0.2 - 1.0 E.U./dL   Urinalysis, Microscopic Only - Urine, Clean Catch    Specimen: Urine, Clean Catch   Result Value Ref Range    RBC, UA 0-2 None Seen, 0-2 /HPF    WBC, UA 3-5 (A) None Seen, 0-2 /HPF    Bacteria, UA None Seen None Seen /HPF    Squamous Epithelial Cells, UA 0-2 None Seen, 0-2 /HPF    Hyaline Casts, UA 0-2 None Seen /LPF    Methodology Automated Microscopy      10/21 A1C 5.2    3/21 LDL 61      ECG 12 Lead    Date/Time: 5/17/2022 11:15 AM  Performed by: Alia Limon MD  Authorized by: Alia Limon MD   Comparison: compared with previous ECG from 4/5/2021  Similar to previous ECG  Rhythm: sinus rhythm and sinus bradycardia  Rate: normal  BPM: 57  Conduction: conduction normal  ST Segments: ST segments normal  T Waves: T waves normal  QRS axis: normal  Other findings: poor R wave progression  Clinical impression comment: stable EKG            ASSESSMENT/PLAN    Diagnoses and all orders for this visit:    1. PE (physical exam), routine (Primary)  Assessment & Plan:  Health maintenance - COVID19 vacc done, incl 3rd dose Pfizer; flu 10/21; PVX 3/20; rec Prevnar 20; Td 10/21 (next Td due 2031); HAV and Shingrix done; mammo 3/31/21, pending 5/19/22;breast/pelvic exam with Pap performed today (plan on no further Paps after this unless abnlities; DEXA 4/21, repeat 2023-24; colonosc 7/25/17, repeat 2027 per Dr. Stewart; eye exam pending per pt; dental exam q6 mos; (+) seat belt use    Orders:  -     LIQUID-BASED PAP SMEAR, [facility] ([facility]); Future    2. Hyperlipidemia  Assessment & Plan:  Lipids acceptable but LDL increased to 110; cont atorvastatin 10mg QD #90, 3RF; ran out of  atorvastatin for about 1 wk; decrease saturated fats and cholesterol in the diet; repeat lipids in 12 mos      Orders:  -     ECG 12 Lead  -     atorvastatin (LIPITOR) 10 MG tablet; Take 1 tablet by mouth Every Night.  Dispense: 90 tablet; Refill: 3    3. Impaired fasting glucose  Assessment & Plan:  BG control stable with A1C 5.6; encouraged reg phys activity to decr insulin resistance, moderation in unhealthy starches/sweets; f/u A1C in 6 mos        4. Subacute thyroiditis  Assessment & Plan:  asx with bord TSH 4.75; follow clinically - pt wants to update TFTs in 8 wks for f/u    Orders:  -     TSH; Future  -     T4, Free; Future  -     T4, Free    5. Vitamin D deficiency  Assessment & Plan:  Stable vit D level 37.6; cont Ca/vit D 600 units QD and 2000 units QOD supplementation      6. Osteopenia  Assessment & Plan:  Calcium and vitamin D supplementation with weight-bearing exercise; DEXA 4/21, repeat 2023-24         7. Mild persistent asthma without complication  Assessment & Plan:  Unable to update PFTs due to COVID19 pandemic restrictions; will resume symbicort 80/4.5 2 puffs BID #3, 3RF with prn alb; rec Prevnar 20 - agreeable but wants to wait until after mammo    Orders:  -     budesonide-formoterol (Symbicort) 80-4.5 MCG/ACT inhaler; Inhale 2 puffs 2 (Two) Times a Day. Gargle and spit after puffs  Dispense: 3 each; Refill: 3      FOLLOW-UP  1. Pt wants to f/u TFTs in 8 wks (escribed)  2. RTC 6 mos with A1C    Electronically signed by:    Alia Limon MD, FACP  05/17/2022

## 2022-05-15 NOTE — ASSESSMENT & PLAN NOTE
Lipids acceptable but LDL increased to 110; cont atorvastatin 10mg QD #90, 3RF; ran out of atorvastatin for about 1 wk; decrease saturated fats and cholesterol in the diet; repeat lipids in 12 mos

## 2022-05-17 ENCOUNTER — OFFICE VISIT (OUTPATIENT)
Dept: INTERNAL MEDICINE | Facility: CLINIC | Age: 62
End: 2022-05-17

## 2022-05-17 VITALS
BODY MASS INDEX: 24.34 KG/M2 | WEIGHT: 170 LBS | SYSTOLIC BLOOD PRESSURE: 138 MMHG | HEART RATE: 54 BPM | DIASTOLIC BLOOD PRESSURE: 72 MMHG | HEIGHT: 70 IN | OXYGEN SATURATION: 98 %

## 2022-05-17 DIAGNOSIS — E06.1 SUBACUTE THYROIDITIS: Chronic | ICD-10-CM

## 2022-05-17 DIAGNOSIS — R73.01 IMPAIRED FASTING GLUCOSE: Chronic | ICD-10-CM

## 2022-05-17 DIAGNOSIS — J45.30 MILD PERSISTENT ASTHMA WITHOUT COMPLICATION: Chronic | ICD-10-CM

## 2022-05-17 DIAGNOSIS — M85.89 OSTEOPENIA OF MULTIPLE SITES: Chronic | ICD-10-CM

## 2022-05-17 DIAGNOSIS — Z00.00 PE (PHYSICAL EXAM), ROUTINE: Primary | Chronic | ICD-10-CM

## 2022-05-17 DIAGNOSIS — Z00.00 PE (PHYSICAL EXAM), ROUTINE: Chronic | ICD-10-CM

## 2022-05-17 DIAGNOSIS — E78.00 PURE HYPERCHOLESTEROLEMIA: Chronic | ICD-10-CM

## 2022-05-17 DIAGNOSIS — E55.9 VITAMIN D DEFICIENCY: Chronic | ICD-10-CM

## 2022-05-17 PROCEDURE — 99396 PREV VISIT EST AGE 40-64: CPT | Performed by: INTERNAL MEDICINE

## 2022-05-17 PROCEDURE — 93000 ELECTROCARDIOGRAM COMPLETE: CPT | Performed by: INTERNAL MEDICINE

## 2022-05-17 RX ORDER — ATORVASTATIN CALCIUM 10 MG/1
10 TABLET, FILM COATED ORAL NIGHTLY
Qty: 90 TABLET | Refills: 3 | Status: SHIPPED | OUTPATIENT
Start: 2022-05-17

## 2022-05-17 RX ORDER — BUDESONIDE AND FORMOTEROL FUMARATE DIHYDRATE 80; 4.5 UG/1; UG/1
2 AEROSOL RESPIRATORY (INHALATION)
Qty: 3 EACH | Refills: 3 | Status: SHIPPED | OUTPATIENT
Start: 2022-05-17

## 2022-05-19 ENCOUNTER — HOSPITAL ENCOUNTER (OUTPATIENT)
Dept: MAMMOGRAPHY | Facility: HOSPITAL | Age: 62
Discharge: HOME | End: 2022-05-19
Admitting: INTERNAL MEDICINE

## 2022-05-19 DIAGNOSIS — Z12.31 VISIT FOR SCREENING MAMMOGRAM: ICD-10-CM

## 2022-05-19 PROCEDURE — 77063 BREAST TOMOSYNTHESIS BI: CPT | Performed by: RADIOLOGY

## 2022-05-19 PROCEDURE — 77067 SCR MAMMO BI INCL CAD: CPT

## 2022-05-19 PROCEDURE — 77063 BREAST TOMOSYNTHESIS BI: CPT

## 2022-05-19 PROCEDURE — 77067 SCR MAMMO BI INCL CAD: CPT | Performed by: RADIOLOGY

## 2022-05-23 LAB — REF LAB TEST METHOD: NORMAL

## 2022-07-21 ENCOUNTER — LAB (OUTPATIENT)
Dept: LAB | Facility: HOSPITAL | Age: 62
End: 2022-07-21

## 2022-07-21 DIAGNOSIS — E06.1 SUBACUTE THYROIDITIS: Chronic | ICD-10-CM

## 2022-07-21 PROCEDURE — 84443 ASSAY THYROID STIM HORMONE: CPT

## 2022-07-22 LAB — TSH SERPL DL<=0.05 MIU/L-ACNC: 2.48 UIU/ML (ref 0.27–4.2)

## 2022-11-14 NOTE — PROGRESS NOTES
"Chief Complaint   Patient presents with   • Impaired Fasting Glucose       History of Present Illness  62 y.o.  woman presents for sugar follow-up as well as discussion re: nonhealing lesion at right mid-shin. Notes injury back in July, 4 mos ago, unknown injury. Developed a knot in that area, assoc'd with some itching and redness. Eventually the knot resolved, the itching resolved, but the scab don't go away. When she cleans it with alcohol, sometimes the scab falls off but then it comes back. Sometimes the surrounding redness is increased but then goes back down. Denies drainage. Notes some pain to touch but otherwise is asx at rest.    Review of Systems  ROS (+) for nonhealing lesion right leg. Denies fevers, CP, palpitations, SOB, falls. All other ROS reviewed and negative.  SH: retired, likely now more sedentary    Current Outpatient Medications:   •  albuterol sulfate HFA (PROAIR HFA) 108 (90 Base) MCG/ACT inhaler prn  •  atorvastatin (LIPITOR) 10 MG QD  •  budesonide-formoterol (Symbicort) 80-4.5 MCG/ACT inhaler, 2 puffs BID  •  Cholecalciferol (VITAMIN D-3) 1000 UNITS  QD  •  fluticasone (FLONASE) 50 MCG/ACT nasal spray AD  •  Naproxen Sodium 220 MG prn    VITALS:  /68   Pulse 63   Ht 177.8 cm (70\")   Wt 78.5 kg (173 lb)   SpO2 99%   BMI 24.82 kg/m²     Physical Exam  Vitals and nursing note reviewed.   Constitutional:       General: She is not in acute distress.     Appearance: Normal appearance. She is not ill-appearing.   Eyes:      Extraocular Movements: Extraocular movements intact.      Conjunctiva/sclera: Conjunctivae normal.   Pulmonary:      Effort: Pulmonary effort is normal. No respiratory distress.   Musculoskeletal:      Right lower leg: No edema.      Left lower leg: No edema.   Skin:     Findings: Lesion (right mid-shin with 2mm scab with 5mm surrounding erythema, minimally tender to touch, no flakiness, no increased warmth, no vesicular change, no drainage, no swelling) " present.   Neurological:      Mental Status: She is alert and oriented to person, place, and time. Mental status is at baseline.   Psychiatric:         Mood and Affect: Mood normal.         Behavior: Behavior normal.         LABS  Results for orders placed or performed in visit on 11/17/22   POC Glycosylated Hemoglobin (Hb A1C)    Specimen: Blood   Result Value Ref Range    Hemoglobin A1C 5.9 %    Lot Number [id number]     Expiration Date 09/07/2024 5/22 A1C 5.6    ASSESSMENT/PLAN    Diagnoses and all orders for this visit:    1. Impaired fasting glucose (Primary)  Assessment & Plan:  BG control stable with A1C 5.6; encouraged reg phys activity to decr insulin resistance, moderation in unhealthy starches/sweets; f/u A1C in 6 mos      Orders:  -     POC Glycosylated Hemoglobin (Hb A1C)    2. Leg skin lesion, right  Comments:  nonhealing x 4 mos; rec temovate cream 0.05% thin layer BID x 2 wks and keeping it covered to decr friction; rec derm if does not resolve  Orders:  -     clobetasol (TEMOVATE) 0.05 % cream; Apply to affected areas in thin layer BID  Dispense: 30 g; Refill: 0    3. Need for influenza vaccination  -     FluLaval/Fluarix/Fluzone >6 Months    4. Vaccine counseling  -     Pneumococcal Conjugate Vaccine 20-Valent All      FOLLOW-UP  1. Health maintenance - COVID19 vacc done, incl new COVID19 vacc; rec updated flu vacc and Prevnar 20 (BOTH GIVEN TODAY)  2. RTC for PE 5/19/23; fasting labs prior to appt (CBC, CMP, TSH, lipids, UA/micr, A1C, vit D, CPK, FT4    Electronically signed by:    Alia Limon MD, FACP  11/17/2022

## 2022-11-17 ENCOUNTER — OFFICE VISIT (OUTPATIENT)
Dept: INTERNAL MEDICINE | Facility: CLINIC | Age: 62
End: 2022-11-17

## 2022-11-17 VITALS
DIASTOLIC BLOOD PRESSURE: 68 MMHG | SYSTOLIC BLOOD PRESSURE: 118 MMHG | HEIGHT: 70 IN | OXYGEN SATURATION: 99 % | BODY MASS INDEX: 24.77 KG/M2 | HEART RATE: 63 BPM | WEIGHT: 173 LBS

## 2022-11-17 DIAGNOSIS — Z23 NEED FOR INFLUENZA VACCINATION: ICD-10-CM

## 2022-11-17 DIAGNOSIS — L98.9 LEG SKIN LESION, RIGHT: ICD-10-CM

## 2022-11-17 DIAGNOSIS — Z71.85 VACCINE COUNSELING: ICD-10-CM

## 2022-11-17 DIAGNOSIS — R73.01 IMPAIRED FASTING GLUCOSE: Primary | Chronic | ICD-10-CM

## 2022-11-17 LAB
EXPIRATION DATE: NORMAL
HBA1C MFR BLD: 5.9 %
Lab: NORMAL

## 2022-11-17 PROCEDURE — 90677 PCV20 VACCINE IM: CPT | Performed by: INTERNAL MEDICINE

## 2022-11-17 PROCEDURE — 90471 IMMUNIZATION ADMIN: CPT | Performed by: INTERNAL MEDICINE

## 2022-11-17 PROCEDURE — 99214 OFFICE O/P EST MOD 30 MIN: CPT | Performed by: INTERNAL MEDICINE

## 2022-11-17 PROCEDURE — 90472 IMMUNIZATION ADMIN EACH ADD: CPT | Performed by: INTERNAL MEDICINE

## 2022-11-17 PROCEDURE — 90686 IIV4 VACC NO PRSV 0.5 ML IM: CPT | Performed by: INTERNAL MEDICINE

## 2022-11-17 PROCEDURE — 83036 HEMOGLOBIN GLYCOSYLATED A1C: CPT | Performed by: INTERNAL MEDICINE

## 2022-11-17 RX ORDER — CLOBETASOL PROPIONATE 0.5 MG/G
CREAM TOPICAL
Qty: 30 G | Refills: 0 | Status: SHIPPED | OUTPATIENT
Start: 2022-11-17

## 2023-05-05 DIAGNOSIS — E78.00 PURE HYPERCHOLESTEROLEMIA: ICD-10-CM

## 2023-05-05 DIAGNOSIS — R73.01 IMPAIRED FASTING GLUCOSE: ICD-10-CM

## 2023-05-05 DIAGNOSIS — Z00.00 PE (PHYSICAL EXAM), ROUTINE: Primary | ICD-10-CM

## 2023-05-05 DIAGNOSIS — E55.9 VITAMIN D DEFICIENCY: ICD-10-CM

## 2023-05-08 ENCOUNTER — TRANSCRIBE ORDERS (OUTPATIENT)
Dept: ADMINISTRATIVE | Facility: HOSPITAL | Age: 63
End: 2023-05-08
Payer: COMMERCIAL

## 2023-05-08 DIAGNOSIS — Z12.31 VISIT FOR SCREENING MAMMOGRAM: Primary | ICD-10-CM

## 2023-05-12 ENCOUNTER — LAB (OUTPATIENT)
Dept: LAB | Facility: HOSPITAL | Age: 63
End: 2023-05-12
Payer: COMMERCIAL

## 2023-05-12 DIAGNOSIS — Z00.00 PE (PHYSICAL EXAM), ROUTINE: ICD-10-CM

## 2023-05-12 DIAGNOSIS — E78.00 PURE HYPERCHOLESTEROLEMIA: ICD-10-CM

## 2023-05-12 DIAGNOSIS — R73.01 IMPAIRED FASTING GLUCOSE: ICD-10-CM

## 2023-05-12 DIAGNOSIS — E55.9 VITAMIN D DEFICIENCY: ICD-10-CM

## 2023-05-12 LAB
25(OH)D3 SERPL-MCNC: 58 NG/ML (ref 30–100)
ALBUMIN SERPL-MCNC: 4.5 G/DL (ref 3.5–5.2)
ALBUMIN/GLOB SERPL: 1.9 G/DL
ALP SERPL-CCNC: 64 U/L (ref 39–117)
ALT SERPL W P-5'-P-CCNC: 23 U/L (ref 1–33)
ANION GAP SERPL CALCULATED.3IONS-SCNC: 9.5 MMOL/L (ref 5–15)
AST SERPL-CCNC: 26 U/L (ref 1–32)
BACTERIA UR QL AUTO: ABNORMAL /HPF
BASOPHILS # BLD AUTO: 0.05 10*3/MM3 (ref 0–0.2)
BASOPHILS NFR BLD AUTO: 0.9 % (ref 0–1.5)
BILIRUB SERPL-MCNC: 0.4 MG/DL (ref 0–1.2)
BILIRUB UR QL STRIP: NEGATIVE
BUN SERPL-MCNC: 9 MG/DL (ref 8–23)
BUN/CREAT SERPL: 9.4 (ref 7–25)
CALCIUM SPEC-SCNC: 9.6 MG/DL (ref 8.6–10.5)
CHLORIDE SERPL-SCNC: 103 MMOL/L (ref 98–107)
CHOLEST SERPL-MCNC: 191 MG/DL (ref 0–200)
CK SERPL-CCNC: 98 U/L (ref 20–180)
CLARITY UR: CLEAR
CO2 SERPL-SCNC: 26.5 MMOL/L (ref 22–29)
COD CRY URNS QL: ABNORMAL /HPF
COLOR UR: YELLOW
CREAT SERPL-MCNC: 0.96 MG/DL (ref 0.57–1)
DEPRECATED RDW RBC AUTO: 43.9 FL (ref 37–54)
EGFRCR SERPLBLD CKD-EPI 2021: 66.2 ML/MIN/1.73
EOSINOPHIL # BLD AUTO: 0.41 10*3/MM3 (ref 0–0.4)
EOSINOPHIL NFR BLD AUTO: 7 % (ref 0.3–6.2)
ERYTHROCYTE [DISTWIDTH] IN BLOOD BY AUTOMATED COUNT: 13.4 % (ref 12.3–15.4)
GLOBULIN UR ELPH-MCNC: 2.4 GM/DL
GLUCOSE SERPL-MCNC: 94 MG/DL (ref 65–99)
GLUCOSE UR STRIP-MCNC: NEGATIVE MG/DL
HBA1C MFR BLD: 5.6 % (ref 4.8–5.6)
HCT VFR BLD AUTO: 39.3 % (ref 34–46.6)
HDLC SERPL-MCNC: 99 MG/DL (ref 40–60)
HGB BLD-MCNC: 13.1 G/DL (ref 12–15.9)
HGB UR QL STRIP.AUTO: NEGATIVE
HYALINE CASTS UR QL AUTO: ABNORMAL /LPF
IMM GRANULOCYTES # BLD AUTO: 0.01 10*3/MM3 (ref 0–0.05)
IMM GRANULOCYTES NFR BLD AUTO: 0.2 % (ref 0–0.5)
KETONES UR QL STRIP: NEGATIVE
LDLC SERPL CALC-MCNC: 83 MG/DL (ref 0–100)
LDLC/HDLC SERPL: 0.84 {RATIO}
LEUKOCYTE ESTERASE UR QL STRIP.AUTO: ABNORMAL
LYMPHOCYTES # BLD AUTO: 2.19 10*3/MM3 (ref 0.7–3.1)
LYMPHOCYTES NFR BLD AUTO: 37.3 % (ref 19.6–45.3)
MCH RBC QN AUTO: 29.8 PG (ref 26.6–33)
MCHC RBC AUTO-ENTMCNC: 33.3 G/DL (ref 31.5–35.7)
MCV RBC AUTO: 89.5 FL (ref 79–97)
MONOCYTES # BLD AUTO: 0.6 10*3/MM3 (ref 0.1–0.9)
MONOCYTES NFR BLD AUTO: 10.2 % (ref 5–12)
NEUTROPHILS NFR BLD AUTO: 2.61 10*3/MM3 (ref 1.7–7)
NEUTROPHILS NFR BLD AUTO: 44.4 % (ref 42.7–76)
NITRITE UR QL STRIP: NEGATIVE
NRBC BLD AUTO-RTO: 0.2 /100 WBC (ref 0–0.2)
PH UR STRIP.AUTO: 5.5 [PH] (ref 5–8)
PLATELET # BLD AUTO: 351 10*3/MM3 (ref 140–450)
PMV BLD AUTO: 9.8 FL (ref 6–12)
POTASSIUM SERPL-SCNC: 4.1 MMOL/L (ref 3.5–5.2)
PROT SERPL-MCNC: 6.9 G/DL (ref 6–8.5)
PROT UR QL STRIP: ABNORMAL
RBC # BLD AUTO: 4.39 10*6/MM3 (ref 3.77–5.28)
RBC # UR STRIP: ABNORMAL /HPF
REF LAB TEST METHOD: ABNORMAL
SODIUM SERPL-SCNC: 139 MMOL/L (ref 136–145)
SP GR UR STRIP: 1.02 (ref 1–1.03)
SQUAMOUS #/AREA URNS HPF: ABNORMAL /HPF
T4 FREE SERPL-MCNC: 1.14 NG/DL (ref 0.93–1.7)
TRIGL SERPL-MCNC: 45 MG/DL (ref 0–150)
TSH SERPL DL<=0.05 MIU/L-ACNC: 2.88 UIU/ML (ref 0.27–4.2)
UROBILINOGEN UR QL STRIP: ABNORMAL
VLDLC SERPL-MCNC: 9 MG/DL (ref 5–40)
WBC # UR STRIP: ABNORMAL /HPF
WBC NRBC COR # BLD: 5.87 10*3/MM3 (ref 3.4–10.8)

## 2023-05-12 PROCEDURE — 82550 ASSAY OF CK (CPK): CPT

## 2023-05-12 PROCEDURE — 84439 ASSAY OF FREE THYROXINE: CPT

## 2023-05-12 PROCEDURE — 81001 URINALYSIS AUTO W/SCOPE: CPT

## 2023-05-12 PROCEDURE — 36415 COLL VENOUS BLD VENIPUNCTURE: CPT

## 2023-05-12 PROCEDURE — 82306 VITAMIN D 25 HYDROXY: CPT

## 2023-05-12 PROCEDURE — 80050 GENERAL HEALTH PANEL: CPT

## 2023-05-12 PROCEDURE — 83036 HEMOGLOBIN GLYCOSYLATED A1C: CPT

## 2023-05-12 PROCEDURE — 80061 LIPID PANEL: CPT

## 2023-05-18 NOTE — ASSESSMENT & PLAN NOTE
Unable to update PFTs due to COVID19 pandemic and lack of supplies; patient is stable/asx on symbicort 80/4.5 2 puffs BID #3, 3RF

## 2023-05-18 NOTE — PROGRESS NOTES
"Chief Complaint   Patient presents with   • Annual Exam   • Hyperlipidemia       History of Present Illness  62 y.o.  woman presents for updated phys examination and f/u on cholesterol and sugars. Has been eating oatmeal.    Reports discomfort with urination at end urination when she had labs done; drank more water and cranberry. Sxs currently resolved.    Complains of increased knee pain L>R, noting  is worried about her as she holds railing to go down stairs. Denies falls or injuries. Asx with bicycling unless standing up to bike.     Review of Systems  Denies headaches, visual changes, CP, palpitations, SOB, cough, abd pain, n/v/d, difficulty with urination, numbness/tingling, falls, mood changes, lightheadedness, hearing changes, rashes.    ROS (+) for bilat knee pain L>R, worsened with going up/down stairs; denies falls.    Denies vaginal discharge or bleeding (no periods) or breast concerns.   All other ROS reviewed and negative.    Current Outpatient Medications:   •  albuterol sulfate HFA (PROAIR HFA) 108 (90 Base) MCG/ACT inhaler prn  •  atorvastatin (LIPITOR) 10 MG QD  •  budesonide-formoterol (Symbicort) 80-4.5 MCG/ACT 1 puff BID  •  Cholecalciferol (VITAMIN D-3) 1000 UNITS QD  •  fluticasone (FLONASE) 50 MCG/ACT nasal spray AD  •  Naproxen Sodium 220 MG bid prn    VITALS:  /74   Pulse 62   Ht 177.8 cm (70\")   Wt 73.9 kg (163 lb)   SpO2 98%   BMI 23.39 kg/m²     Physical Exam  Vitals and nursing note reviewed.   Constitutional:       General: She is not in acute distress.     Appearance: Normal appearance. She is well-developed.   HENT:      Head: Normocephalic.      Right Ear: Tympanic membrane, ear canal and external ear normal. There is no impacted cerumen.      Ears:      Comments: L. Ear chronically abnl with pinpoint canal     Nose: Nose normal.   Eyes:      Extraocular Movements: Extraocular movements intact.      Conjunctiva/sclera: Conjunctivae normal.      Pupils: " Pupils are equal, round, and reactive to light.   Neck:      Vascular: No carotid bruit (bilaterally).   Cardiovascular:      Rate and Rhythm: Regular rhythm. Bradycardia present.      Heart sounds: Normal heart sounds.   Pulmonary:      Effort: Pulmonary effort is normal. No respiratory distress.      Breath sounds: No wheezing, rhonchi or rales.   Abdominal:      General: Bowel sounds are normal. There is no distension.      Palpations: Abdomen is soft. There is no mass.      Tenderness: There is no abdominal tenderness.   Genitourinary:     Comments: Chaperone declined by patient.    Breast exam unremarkable without masses, skin changes, nipple discharge, or axillary adenopathy.    Musculoskeletal:         General: No tenderness (bilat knees FROM, mild crepitus; denies swelling, erythema, increased warmth).      Cervical back: Normal range of motion and neck supple.      Right lower leg: No edema.      Left lower leg: No edema.   Lymphadenopathy:      Cervical: No cervical adenopathy.   Skin:     General: Skin is warm and dry.      Findings: No rash.   Neurological:      Mental Status: She is alert and oriented to person, place, and time.      Cranial Nerves: No cranial nerve deficit.      Gait: Gait normal.      Deep Tendon Reflexes: Reflexes normal.   Psychiatric:         Mood and Affect: Mood normal.         Behavior: Behavior normal.       LABS  Results for orders placed or performed in visit on 05/19/23   POC Urinalysis Dipstick, Automated    Specimen: Urine   Result Value Ref Range    Color Yellow Yellow, Straw, Dark Yellow, [place]    Clarity, UA Clear Clear    Specific Gravity  1.010 1.005 - 1.030    pH, Urine 6.0 5.0 - 8.0    Leukocytes Negative Negative    Nitrite, UA Negative Negative    Protein, POC Negative Negative mg/dL    Glucose, UA Negative Negative mg/dL    Ketones, UA Negative Negative    Urobilinogen, UA Normal Normal, 0.2 E.U./dL    Bilirubin Negative Negative    Blood, UA Negative Negative     Lot Number [id number]     Expiration Date 03/25/24 5/12/23 stable CBC, CMP (FG 94), A1C 5.6, CPK, TFTs, vit D 58, lipids , TG 45, HDL 99, LDL 83 except contaminated UA    11/22 A1C 5.9      ECG 12 Lead    Date/Time: 5/19/2023 2:07 PM  Performed by: Alia Limon MD  Authorized by: Alia Limon MD   Comparison: compared with previous ECG from 5/17/2022  Similar to previous ECG  Rhythm: sinus rhythm and sinus bradycardia  Rate: normal  BPM: 49  Conduction: conduction normal  ST Segments: ST segments normal  T Waves: T waves normal  QRS axis: normal  Clinical impression comment: stable EKG               ASSESSMENT/PLAN    Diagnoses and all orders for this visit:    1. PE (physical exam), routine (Primary)  Assessment & Plan:  Health maintenance - COVID19 vacc done, incl bivalent vacc; rec consideration for 2nd dose bivalent vacc d/t asthma; flu 11/22; PVX 3/20; Prev20 11/22; Td 10/21 (next Td due 2031); HAV and Shingrix done; rec RSV vacc in the fall; mammo pending 6/1/23; nl Pap 5/22, repeat 2025; DEXA ordered (last 4/21); colonosc 7/25/17, repeat 2027 per Dr. Stewart; eye exam pending per pt; dental exam q6 mos; (+) seat belt use      2. Hyperlipidemia  Assessment & Plan:  Lipids stable with LDL 83; cont atorvastatin 10mg QD #90, 3RF    Orders:  -     atorvastatin (LIPITOR) 10 MG tablet; Take 1 tablet by mouth Every Night.  Dispense: 90 tablet; Refill: 3  -     ECG 12 Lead    3. Impaired fasting glucose  Assessment & Plan:  BG control stable/improved with A1C 5.6; encouraged reg phys activity to decr insulin resistance, moderation in unhealthy starches/sweets; f/u A1C in 6 mos        4. Vitamin D deficiency  Assessment & Plan:  Stable vit D level 58; cont 1000 units QD supplementation      5. Subacute thyroiditis  Assessment & Plan:  Euthyroid, no meds      6. Osteopenia  Assessment & Plan:  Calcium and vitamin D supplementation with weight-bearing exercise; DEXA ordered         7. Mild persistent  asthma without complication  Assessment & Plan:  Unable to update PFTs due to COVID19 pandemic and lack of supplies; patient is stable/asx on symbicort 80/4.5 2 puffs BID #3, 3RF      Orders:  -     budesonide-formoterol (Symbicort) 80-4.5 MCG/ACT inhaler; Inhale 2 puffs 2 (Two) Times a Day. Gargle and spit after puffs  Dispense: 3 each; Refill: 3    8. Menopause  -     DEXA Bone Density Axial; Future    9. Osteoarthritis of both knees  Assessment & Plan:  bilat knee pain L>R, discussed importance of quad/hamstring strengthening, fall precautions; obtain baseline xrays; advised PT for strengthening - pt will consider    Orders:  -     XR Knee 1 or 2 View Right; Future  -     XR Knee 1 or 2 View Left; Future    10. Dysuria  Comments:  f/u UA/micro; ADDENDUM - neg UA  Orders:  -     POC Urinalysis Dipstick, Automated      FOLLOW-UP  RTC 6 mos with A1C    Electronically signed by:    Alia Limon MD, FACP  05/19/2023

## 2023-05-18 NOTE — ASSESSMENT & PLAN NOTE
Health maintenance - COVID19 vacc done, incl bivalent vacc; rec consideration for 2nd dose bivalent vacc d/t asthma; flu 11/22; PVX 3/20; Prev20 11/22; Td 10/21 (next Td due 2031); HAV and Shingrix done; rec RSV vacc in the fall; mammo pending 6/1/23; nl Pap 5/22, repeat 2025; DEXA ordered (last 4/21); colonosc 7/25/17, repeat 2027 per Dr. Stewart; eye exam pending per pt; dental exam q6 mos; (+) seat belt use

## 2023-05-19 ENCOUNTER — OFFICE VISIT (OUTPATIENT)
Dept: INTERNAL MEDICINE | Facility: CLINIC | Age: 63
End: 2023-05-19
Payer: COMMERCIAL

## 2023-05-19 VITALS
DIASTOLIC BLOOD PRESSURE: 74 MMHG | HEIGHT: 70 IN | WEIGHT: 163 LBS | HEART RATE: 62 BPM | SYSTOLIC BLOOD PRESSURE: 138 MMHG | OXYGEN SATURATION: 98 % | BODY MASS INDEX: 23.34 KG/M2

## 2023-05-19 DIAGNOSIS — R30.0 DYSURIA: ICD-10-CM

## 2023-05-19 DIAGNOSIS — R73.01 IMPAIRED FASTING GLUCOSE: Chronic | ICD-10-CM

## 2023-05-19 DIAGNOSIS — E55.9 VITAMIN D DEFICIENCY: Chronic | ICD-10-CM

## 2023-05-19 DIAGNOSIS — E78.00 PURE HYPERCHOLESTEROLEMIA: Chronic | ICD-10-CM

## 2023-05-19 DIAGNOSIS — J45.30 MILD PERSISTENT ASTHMA WITHOUT COMPLICATION: Chronic | ICD-10-CM

## 2023-05-19 DIAGNOSIS — Z78.0 MENOPAUSE: Chronic | ICD-10-CM

## 2023-05-19 DIAGNOSIS — Z00.00 PE (PHYSICAL EXAM), ROUTINE: Primary | Chronic | ICD-10-CM

## 2023-05-19 DIAGNOSIS — M85.89 OSTEOPENIA OF MULTIPLE SITES: Chronic | ICD-10-CM

## 2023-05-19 DIAGNOSIS — E06.1 SUBACUTE THYROIDITIS: Chronic | ICD-10-CM

## 2023-05-19 DIAGNOSIS — M17.0 PRIMARY OSTEOARTHRITIS OF BOTH KNEES: Chronic | ICD-10-CM

## 2023-05-19 PROBLEM — R03.0 ELEVATED BLOOD PRESSURE READING WITHOUT DIAGNOSIS OF HYPERTENSION: Status: ACTIVE | Noted: 2023-05-19

## 2023-05-19 LAB
BILIRUB BLD-MCNC: NEGATIVE MG/DL
CLARITY, POC: CLEAR
COLOR UR: YELLOW
EXPIRATION DATE: NORMAL
GLUCOSE UR STRIP-MCNC: NEGATIVE MG/DL
KETONES UR QL: NEGATIVE
LEUKOCYTE EST, POC: NEGATIVE
Lab: NORMAL
NITRITE UR-MCNC: NEGATIVE MG/ML
PH UR: 6 [PH] (ref 5–8)
PROT UR STRIP-MCNC: NEGATIVE MG/DL
RBC # UR STRIP: NEGATIVE /UL
SP GR UR: 1.01 (ref 1–1.03)
UROBILINOGEN UR QL: NORMAL

## 2023-05-19 PROCEDURE — 93000 ELECTROCARDIOGRAM COMPLETE: CPT | Performed by: INTERNAL MEDICINE

## 2023-05-19 PROCEDURE — 99396 PREV VISIT EST AGE 40-64: CPT | Performed by: INTERNAL MEDICINE

## 2023-05-19 PROCEDURE — 81003 URINALYSIS AUTO W/O SCOPE: CPT | Performed by: INTERNAL MEDICINE

## 2023-05-19 RX ORDER — BUDESONIDE AND FORMOTEROL FUMARATE DIHYDRATE 80; 4.5 UG/1; UG/1
2 AEROSOL RESPIRATORY (INHALATION)
Qty: 3 EACH | Refills: 3 | Status: SHIPPED | OUTPATIENT
Start: 2023-05-19

## 2023-05-19 RX ORDER — ATORVASTATIN CALCIUM 10 MG/1
10 TABLET, FILM COATED ORAL NIGHTLY
Qty: 90 TABLET | Refills: 3 | Status: SHIPPED | OUTPATIENT
Start: 2023-05-19

## 2023-05-20 DIAGNOSIS — E78.00 PURE HYPERCHOLESTEROLEMIA: Chronic | ICD-10-CM

## 2023-05-20 RX ORDER — ATORVASTATIN CALCIUM 10 MG/1
TABLET, FILM COATED ORAL
Qty: 90 TABLET | Refills: 3 | OUTPATIENT
Start: 2023-05-20

## 2023-05-20 NOTE — ASSESSMENT & PLAN NOTE
bilat knee pain L>R, discussed importance of quad/hamstring strengthening, fall precautions; obtain baseline xrays; advised PT for strengthening - pt will consider

## 2023-05-25 ENCOUNTER — HOSPITAL ENCOUNTER (OUTPATIENT)
Dept: GENERAL RADIOLOGY | Facility: HOSPITAL | Age: 63
Discharge: HOME | End: 2023-05-25
Payer: COMMERCIAL

## 2023-05-25 DIAGNOSIS — M17.0 PRIMARY OSTEOARTHRITIS OF BOTH KNEES: Chronic | ICD-10-CM

## 2023-05-25 PROCEDURE — 73560 X-RAY EXAM OF KNEE 1 OR 2: CPT

## 2023-05-26 ENCOUNTER — TELEPHONE (OUTPATIENT)
Dept: INTERNAL MEDICINE | Facility: CLINIC | Age: 63
End: 2023-05-26
Payer: COMMERCIAL

## 2023-05-26 NOTE — TELEPHONE ENCOUNTER
----- Message from Alia Limon MD sent at 5/26/2023  4:42 PM EDT -----  Xrays of both knees show mild arthritis, no fluid in the joint.   Recommend strengthening exercises as discussed; let us know if she wants to proceed with PT

## 2023-06-01 ENCOUNTER — HOSPITAL ENCOUNTER (OUTPATIENT)
Dept: MAMMOGRAPHY | Facility: HOSPITAL | Age: 63
Discharge: HOME | End: 2023-06-01
Admitting: INTERNAL MEDICINE

## 2023-06-01 DIAGNOSIS — Z12.31 VISIT FOR SCREENING MAMMOGRAM: ICD-10-CM

## 2023-06-01 PROCEDURE — 77067 SCR MAMMO BI INCL CAD: CPT

## 2023-06-01 PROCEDURE — 77063 BREAST TOMOSYNTHESIS BI: CPT

## 2023-11-19 NOTE — PROGRESS NOTES
"Chief Complaint   Patient presents with    Hypertension    Impaired fasting glucose       History of Present Illness  63 y.o.  female presents for sugar f/u. Not checking BPs but notes previously in the 120s systolic.    Notes discontinuation of Prevagen.    Review of Systems  Denies CP, SOB, falls. All other ROS reviewed and negative.    Current Outpatient Medications:     albuterol sulfate HFA (PROAIR HFA) inhaler prn    atorvastatin 10 MG QD    budesonide-formoterol (Symbicort) 80-4.5 MCG/ACT inhaler 2 puff BID    Cholecalciferol (VITAMIN D-3) 1000 UNITS QD    fluticasone (FLONASE) 50 MCG/ACT nasal spray AD    Naproxen Sodium 220 MG BID prn    VITALS:  /84   Pulse 52   Temp 97.4 °F (36.3 °C)   Ht 177.8 cm (70\")   Wt 72.1 kg (159 lb)   SpO2 98%   BMI 22.81 kg/m²   Repeat BP left arm 148/70, right arm 146/72    Physical Exam  Vitals and nursing note reviewed.   Constitutional:       General: She is not in acute distress.     Appearance: Normal appearance. She is not ill-appearing.   Eyes:      Extraocular Movements: Extraocular movements intact.      Conjunctiva/sclera: Conjunctivae normal.   Pulmonary:      Effort: Pulmonary effort is normal. No respiratory distress.   Neurological:      Mental Status: She is alert. Mental status is at baseline.   Psychiatric:         Mood and Affect: Mood normal.         Behavior: Behavior normal.       LABS  Results for orders placed or performed in visit on 11/27/23   POC Glycosylated Hemoglobin (Hb A1C)    Specimen: Blood   Result Value Ref Range    Hemoglobin A1C 6.0 (A) 4.5 - 5.7 %    Lot Number [id number]     Expiration Date 07/30/2025 5/12/23 A1C 5.6    11/17/22 A1C 5.9    ASSESSMENT/PLAN    Diagnoses and all orders for this visit:    1. Impaired fasting glucose (Primary)  Assessment & Plan:  BG control borderline with A1C 6.0; encouraged reg phys activity to decr insulin resistance, moderation in unhealthy starches/sweets; f/u A1C in 6 " mos      Orders:  -     POC Glycosylated Hemoglobin (Hb A1C)    2. Hypertension  Assessment & Plan:  Recurrent elevated BP, elevated today and worsened on repeat; rec low Na diet, increased aerobic exercise; rec home BP monitoring 2-3x/week with with goal BP < 130/80        3. Need for influenza vaccination  -     Fluzone High-Dose 65+yrs    4. Vaccine counseling  Comments:  rec flu vacc and COVID19 vacc (both given today)  Orders:  -     COVID-19 F23 (Pfizer) 12yrs+ (COMIRNATY)        FOLLOW-UP  Health maintenance - rec flu vacc and COVID19 vacc; RSV vacc done 8/23  RTC for [facility] to [facility] PE 3/4/24; fasting labs prior to appt (CBC, CMP, TSH, lipids, UA/micr, A1C, vit D, CPK, FT4)    Electronically signed by:    Alia Limon MD, FACP  11/27/2023

## 2023-11-19 NOTE — ASSESSMENT & PLAN NOTE
BG control borderline with A1C 6.0; encouraged reg phys activity to decr insulin resistance, moderation in unhealthy starches/sweets; f/u A1C in 6 mos

## 2023-11-19 NOTE — ASSESSMENT & PLAN NOTE
Recurrent elevated BP yielding NEW dx of HTN, elevated today and worsened on repeat; rec low Na diet, increased aerobic exercise; rec home BP monitoring 2-3x/week with with goal BP < 130/80; f/u in 3 mos with Welc to [facility] PE, earlier if consistently > 140/90

## 2023-11-27 ENCOUNTER — OFFICE VISIT (OUTPATIENT)
Dept: INTERNAL MEDICINE | Facility: CLINIC | Age: 63
End: 2023-11-27
Payer: MEDICARE

## 2023-11-27 VITALS
WEIGHT: 159 LBS | DIASTOLIC BLOOD PRESSURE: 84 MMHG | BODY MASS INDEX: 22.76 KG/M2 | SYSTOLIC BLOOD PRESSURE: 138 MMHG | OXYGEN SATURATION: 98 % | HEART RATE: 52 BPM | TEMPERATURE: 97.4 F | HEIGHT: 70 IN

## 2023-11-27 DIAGNOSIS — Z23 NEED FOR INFLUENZA VACCINATION: ICD-10-CM

## 2023-11-27 DIAGNOSIS — Z71.85 VACCINE COUNSELING: ICD-10-CM

## 2023-11-27 DIAGNOSIS — I10 PRIMARY HYPERTENSION: ICD-10-CM

## 2023-11-27 DIAGNOSIS — R73.01 IMPAIRED FASTING GLUCOSE: Primary | Chronic | ICD-10-CM

## 2023-11-27 LAB
EXPIRATION DATE: ABNORMAL
HBA1C MFR BLD: 6 % (ref 4.5–5.7)
Lab: ABNORMAL

## 2023-11-27 PROCEDURE — 90662 IIV NO PRSV INCREASED AG IM: CPT | Performed by: INTERNAL MEDICINE

## 2023-11-27 PROCEDURE — 91320 SARSCV2 VAC 30MCG TRS-SUC IM: CPT | Performed by: INTERNAL MEDICINE

## 2023-11-27 PROCEDURE — 1160F RVW MEDS BY RX/DR IN RCRD: CPT | Performed by: INTERNAL MEDICINE

## 2023-11-27 PROCEDURE — 3079F DIAST BP 80-89 MM HG: CPT | Performed by: INTERNAL MEDICINE

## 2023-11-27 PROCEDURE — 3075F SYST BP GE 130 - 139MM HG: CPT | Performed by: INTERNAL MEDICINE

## 2023-11-27 PROCEDURE — 1159F MED LIST DOCD IN RCRD: CPT | Performed by: INTERNAL MEDICINE

## 2023-11-27 PROCEDURE — G0008 ADMIN INFLUENZA VIRUS VAC: HCPCS | Performed by: INTERNAL MEDICINE

## 2023-11-27 PROCEDURE — 3046F HEMOGLOBIN A1C LEVEL >9.0%: CPT | Performed by: INTERNAL MEDICINE

## 2023-11-27 PROCEDURE — 3044F HG A1C LEVEL LT 7.0%: CPT | Performed by: INTERNAL MEDICINE

## 2023-11-27 PROCEDURE — 83036 HEMOGLOBIN GLYCOSYLATED A1C: CPT | Performed by: INTERNAL MEDICINE

## 2023-11-27 PROCEDURE — 3051F HG A1C>EQUAL 7.0%<8.0%: CPT | Performed by: INTERNAL MEDICINE

## 2023-11-27 PROCEDURE — 90480 ADMN SARSCOV2 VAC 1/ONLY CMP: CPT | Performed by: INTERNAL MEDICINE

## 2023-11-27 PROCEDURE — 99214 OFFICE O/P EST MOD 30 MIN: CPT | Performed by: INTERNAL MEDICINE

## 2023-11-27 PROCEDURE — 3052F HG A1C>EQUAL 8.0%<EQUAL 9.0%: CPT | Performed by: INTERNAL MEDICINE

## 2023-11-27 NOTE — PROGRESS NOTES
Immunization  Immunization performed in left deltoid  by Lula Roach MA. Patient tolerated the procedure well without complications.  11/27/23   Lula Roach MA

## 2023-12-21 ENCOUNTER — TELEPHONE (OUTPATIENT)
Dept: INTERNAL MEDICINE | Facility: CLINIC | Age: 63
End: 2023-12-21
Payer: MEDICARE

## 2023-12-21 NOTE — TELEPHONE ENCOUNTER
She needs to go ahead and make an office visit 1st week or 2 of January so we can discuss BP med options and make a plan to address the elevated BP.    Also ask her to bring her BP machine to the appt.     In the interim, low sodium in her diet and increased aerobic activity will help the blood pressure.

## 2023-12-21 NOTE — TELEPHONE ENCOUNTER
Patient called today and wanted to let Dr Limon know her blood pressure continues to be elevated. Today it was reading 158/83.

## 2023-12-21 NOTE — TELEPHONE ENCOUNTER
"    Name: Memo Guajardo \"Chan\"    Relationship: Self    Best Callback Number: [phone]    HUB PROVIDED THE RELAY MESSAGE FROM THE OFFICE   PATIENT VOICED UNDERSTANDING AND HAS NO FURTHER QUESTIONS AT THIS TIME    ADDITIONAL INFORMATION:SHE MADE AN APPOINTMENT FOR 01/19/2024    "

## 2024-01-11 DIAGNOSIS — R73.01 IMPAIRED FASTING GLUCOSE: Chronic | ICD-10-CM

## 2024-01-11 DIAGNOSIS — E55.9 VITAMIN D DEFICIENCY: Chronic | ICD-10-CM

## 2024-01-11 DIAGNOSIS — Z00.00 PE (PHYSICAL EXAM), ROUTINE: Primary | Chronic | ICD-10-CM

## 2024-01-11 DIAGNOSIS — E78.00 PURE HYPERCHOLESTEROLEMIA: Chronic | ICD-10-CM

## 2024-01-11 DIAGNOSIS — E06.1 SUBACUTE THYROIDITIS: Chronic | ICD-10-CM

## 2024-01-28 PROBLEM — I10 PRIMARY HYPERTENSION: Chronic | Status: ACTIVE | Noted: 2023-05-19

## 2024-01-28 NOTE — PROGRESS NOTES
"Chief Complaint   Patient presents with    Hypertension       History of Present Illness  63 y.o.  female presents for follow-up re: elevated BPs. Home readings have been 140/70s, up to 160s systolic. Note (+) FH HTN in both parents later in life.    Walking 3 mi in 56 minutes, now doing welk-jog. Notes HR getting to 110s when conducting 3 hours at a time; now without that much of exercise.      FH: mother - HTN, on bisoprolol  Father - HTN, heart disease    Review of Systems  Denies headaches, CP, palpitations, SOB. All other ROS reviewed and negative.    Current Outpatient Medications:     albuterol sulfate HFA (PROAIR HFA) inhale prn    atorvastatin 10 MG QD    budesonide-formoterol (Symbicort) 80-4.5 MCG/ACT inhaler 2 puffs BID    Cholecalciferol (VITAMIN D-3) 1000 UNITS QD    fluticasone (FLONASE) 50 MCG/ACT nasal spray AD    Naproxen Sodium 220 MG prn    VITALS:  /78   Pulse 59   Ht 177.8 cm (70\")   Wt 72.1 kg (159 lb)   SpO2 97%   BMI 22.81 kg/m²   Her BP machine left arm 149/81, pulse 57, manual 146/72    Physical Exam  Vitals and nursing note reviewed.   Constitutional:       General: She is not in acute distress.     Appearance: Normal appearance. She is not ill-appearing.   Eyes:      Extraocular Movements: Extraocular movements intact.      Conjunctiva/sclera: Conjunctivae normal.   Cardiovascular:      Rate and Rhythm: Normal rate and regular rhythm.      Heart sounds: Normal heart sounds.   Pulmonary:      Effort: Pulmonary effort is normal. No respiratory distress.      Breath sounds: Normal breath sounds. No wheezing, rhonchi or rales.   Neurological:      Mental Status: She is alert and oriented to person, place, and time. Mental status is at baseline.   Psychiatric:         Mood and Affect: Mood normal.         Behavior: Behavior normal.         LABS  Results for orders placed or performed in visit on 11/27/23   POC Glycosylated Hemoglobin (Hb A1C)    Specimen: Blood   Result " Value Ref Range    Hemoglobin A1C 6.0 (A) 4.5 - 5.7 %    Lot Number [id number]     Expiration Date 07/30/2025 5/12/23 Cr 0.96, GFR 66.2    ASSESSMENT/PLAN    Diagnoses and all orders for this visit:    1. Hypertension (Primary)  Assessment & Plan:  BP mildly elevated 148/78; verified her BP cuff; reviewed med options, goals, side effects, and risks; patient will proceed with irbesartan 150mg QD #30, 1RF; rec low Na diet, increased aerobic exercise; cont home BP monitoring with goal BP < 130/80; rec f/u in 3-4 wks but patient has Welc to [facility] PE pending in 5 wks; she will send [employer] message in 3 wks re: BP readings after starting irbesartan; then she will go ahead and do her PE labs a little early for drug monitoring        Orders:  -     irbesartan (AVAPRO) 150 MG tablet; Take 1 tablet by mouth Daily.  Dispense: 30 tablet; Refill: 1  -     Microalbumin / Creatinine Urine Ratio - Urine, Clean Catch; Future    2. Impaired fasting glucose  Assessment & Plan:  F/u A1C in 6 wks with next PE         documentation - note ongoing continuity of care for the patient with chronic complex medical history and medical problems, serving as focal point for evaluation of multiple problems and as the source for workup of medical concerns, including labs, diagnostic tests, and referrals for the patient    FOLLOW-UP  Health maintenance - flu vacc, COVID19 vacc, and RSV vacc all completed for this season  Send SmashChartt in 3 wks re: BPs after starting irbesartan  RTC for Welc to [facility] PE 3/4/24; fasting labs prior to appt (CBC, CMP, TSH, lipids, UA/micr, A1C, vit D, CPK, FT4) and BP f/u    Electronically signed by:    Alia Limon MD, FACP  01/29/2024

## 2024-01-28 NOTE — ASSESSMENT & PLAN NOTE
BP mildly elevated 148/78; verified her BP cuff; reviewed med options, goals, side effects, and risks; patient will proceed with irbesartan 150mg QD #30, 1RF; rec low Na diet, increased aerobic exercise; cont home BP monitoring with goal BP < 130/80; rec f/u in 3-4 wks but patient has Welc to [facility] PE pending in 5 wks; she will send [employer] message in 3 wks re: BP readings after starting irbesartan; then she will go ahead and do her PE labs a little early for drug monitoring

## 2024-01-29 ENCOUNTER — OFFICE VISIT (OUTPATIENT)
Dept: INTERNAL MEDICINE | Facility: CLINIC | Age: 64
End: 2024-01-29
Payer: MEDICARE

## 2024-01-29 VITALS
DIASTOLIC BLOOD PRESSURE: 78 MMHG | BODY MASS INDEX: 22.76 KG/M2 | WEIGHT: 159 LBS | HEIGHT: 70 IN | OXYGEN SATURATION: 97 % | SYSTOLIC BLOOD PRESSURE: 148 MMHG | HEART RATE: 59 BPM

## 2024-01-29 DIAGNOSIS — I10 PRIMARY HYPERTENSION: ICD-10-CM

## 2024-01-29 DIAGNOSIS — I10 PRIMARY HYPERTENSION: Primary | ICD-10-CM

## 2024-01-29 DIAGNOSIS — R73.01 IMPAIRED FASTING GLUCOSE: Chronic | ICD-10-CM

## 2024-01-29 PROCEDURE — 3077F SYST BP >= 140 MM HG: CPT | Performed by: INTERNAL MEDICINE

## 2024-01-29 PROCEDURE — 99214 OFFICE O/P EST MOD 30 MIN: CPT | Performed by: INTERNAL MEDICINE

## 2024-01-29 PROCEDURE — 3078F DIAST BP <80 MM HG: CPT | Performed by: INTERNAL MEDICINE

## 2024-01-29 RX ORDER — IRBESARTAN 150 MG/1
150 TABLET ORAL DAILY
Qty: 30 TABLET | Refills: 1 | Status: SHIPPED | OUTPATIENT
Start: 2024-01-29

## 2024-01-29 RX ORDER — IRBESARTAN 150 MG/1
150 TABLET ORAL DAILY
Qty: 90 TABLET | OUTPATIENT
Start: 2024-01-29

## 2024-02-21 ENCOUNTER — LAB (OUTPATIENT)
Dept: LAB | Facility: HOSPITAL | Age: 64
End: 2024-02-21
Payer: MEDICARE

## 2024-02-21 DIAGNOSIS — E06.1 SUBACUTE THYROIDITIS: Chronic | ICD-10-CM

## 2024-02-21 DIAGNOSIS — I10 PRIMARY HYPERTENSION: ICD-10-CM

## 2024-02-21 DIAGNOSIS — Z00.00 PE (PHYSICAL EXAM), ROUTINE: ICD-10-CM

## 2024-02-21 DIAGNOSIS — R73.01 IMPAIRED FASTING GLUCOSE: Chronic | ICD-10-CM

## 2024-02-21 DIAGNOSIS — E55.9 VITAMIN D DEFICIENCY: Chronic | ICD-10-CM

## 2024-02-21 DIAGNOSIS — E78.00 PURE HYPERCHOLESTEROLEMIA: Chronic | ICD-10-CM

## 2024-02-21 LAB
25(OH)D3 SERPL-MCNC: 33.2 NG/ML (ref 30–100)
BACTERIA UR QL AUTO: NORMAL /HPF
BASOPHILS # BLD AUTO: 0.06 10*3/MM3 (ref 0–0.2)
BASOPHILS NFR BLD AUTO: 1.1 % (ref 0–1.5)
BILIRUB UR QL STRIP: NEGATIVE
CHOLEST SERPL-MCNC: 193 MG/DL (ref 0–200)
CK SERPL-CCNC: 109 U/L (ref 20–180)
CLARITY UR: CLEAR
COLOR UR: YELLOW
DEPRECATED RDW RBC AUTO: 41 FL (ref 37–54)
EOSINOPHIL # BLD AUTO: 0.36 10*3/MM3 (ref 0–0.4)
EOSINOPHIL NFR BLD AUTO: 6.8 % (ref 0.3–6.2)
ERYTHROCYTE [DISTWIDTH] IN BLOOD BY AUTOMATED COUNT: 12.9 % (ref 12.3–15.4)
GLUCOSE UR STRIP-MCNC: NEGATIVE MG/DL
HCT VFR BLD AUTO: 38 % (ref 34–46.6)
HDLC SERPL-MCNC: 101 MG/DL (ref 40–60)
HGB BLD-MCNC: 12.2 G/DL (ref 12–15.9)
HGB UR QL STRIP.AUTO: NEGATIVE
HYALINE CASTS UR QL AUTO: NORMAL /LPF
IMM GRANULOCYTES # BLD AUTO: 0.01 10*3/MM3 (ref 0–0.05)
IMM GRANULOCYTES NFR BLD AUTO: 0.2 % (ref 0–0.5)
KETONES UR QL STRIP: NEGATIVE
LDLC SERPL CALC-MCNC: 81 MG/DL (ref 0–100)
LDLC/HDLC SERPL: 0.8 {RATIO}
LEUKOCYTE ESTERASE UR QL STRIP.AUTO: NEGATIVE
LYMPHOCYTES # BLD AUTO: 2.02 10*3/MM3 (ref 0.7–3.1)
LYMPHOCYTES NFR BLD AUTO: 38.2 % (ref 19.6–45.3)
MCH RBC QN AUTO: 28.5 PG (ref 26.6–33)
MCHC RBC AUTO-ENTMCNC: 32.1 G/DL (ref 31.5–35.7)
MCV RBC AUTO: 88.8 FL (ref 79–97)
MONOCYTES # BLD AUTO: 0.47 10*3/MM3 (ref 0.1–0.9)
MONOCYTES NFR BLD AUTO: 8.9 % (ref 5–12)
NEUTROPHILS NFR BLD AUTO: 2.37 10*3/MM3 (ref 1.7–7)
NEUTROPHILS NFR BLD AUTO: 44.8 % (ref 42.7–76)
NITRITE UR QL STRIP: NEGATIVE
NRBC BLD AUTO-RTO: 0 /100 WBC (ref 0–0.2)
PH UR STRIP.AUTO: 7.5 [PH] (ref 5–8)
PLATELET # BLD AUTO: 346 10*3/MM3 (ref 140–450)
PMV BLD AUTO: 9.6 FL (ref 6–12)
PROT UR QL STRIP: ABNORMAL
RBC # BLD AUTO: 4.28 10*6/MM3 (ref 3.77–5.28)
RBC # UR STRIP: NORMAL /HPF
REF LAB TEST METHOD: NORMAL
SP GR UR STRIP: 1.01 (ref 1–1.03)
SQUAMOUS #/AREA URNS HPF: NORMAL /HPF
T4 FREE SERPL-MCNC: 1.08 NG/DL (ref 0.93–1.7)
TRIGL SERPL-MCNC: 56 MG/DL (ref 0–150)
TSH SERPL DL<=0.05 MIU/L-ACNC: 2.53 UIU/ML (ref 0.27–4.2)
UROBILINOGEN UR QL STRIP: ABNORMAL
VLDLC SERPL-MCNC: 11 MG/DL (ref 5–40)
WBC # UR STRIP: NORMAL /HPF
WBC NRBC COR # BLD AUTO: 5.29 10*3/MM3 (ref 3.4–10.8)

## 2024-02-21 PROCEDURE — 82043 UR ALBUMIN QUANTITATIVE: CPT

## 2024-02-21 PROCEDURE — 82570 ASSAY OF URINE CREATININE: CPT

## 2024-02-21 PROCEDURE — 84439 ASSAY OF FREE THYROXINE: CPT

## 2024-02-21 PROCEDURE — 85025 COMPLETE CBC W/AUTO DIFF WBC: CPT

## 2024-02-21 PROCEDURE — 81001 URINALYSIS AUTO W/SCOPE: CPT

## 2024-02-21 PROCEDURE — 80053 COMPREHEN METABOLIC PANEL: CPT

## 2024-02-21 PROCEDURE — 83036 HEMOGLOBIN GLYCOSYLATED A1C: CPT

## 2024-02-21 PROCEDURE — 84443 ASSAY THYROID STIM HORMONE: CPT

## 2024-02-21 PROCEDURE — 80061 LIPID PANEL: CPT

## 2024-02-21 PROCEDURE — 82306 VITAMIN D 25 HYDROXY: CPT

## 2024-02-21 PROCEDURE — 82550 ASSAY OF CK (CPK): CPT

## 2024-02-22 LAB
ALBUMIN SERPL-MCNC: 4.3 G/DL (ref 3.5–5.2)
ALBUMIN UR-MCNC: 1.9 MG/DL
ALBUMIN/GLOB SERPL: 1.8 G/DL
ALP SERPL-CCNC: 57 U/L (ref 39–117)
ALT SERPL W P-5'-P-CCNC: 21 U/L (ref 1–33)
ANION GAP SERPL CALCULATED.3IONS-SCNC: 12.2 MMOL/L (ref 5–15)
AST SERPL-CCNC: 29 U/L (ref 1–32)
BILIRUB SERPL-MCNC: 0.5 MG/DL (ref 0–1.2)
BUN SERPL-MCNC: 8 MG/DL (ref 8–23)
BUN/CREAT SERPL: 11 (ref 7–25)
CALCIUM SPEC-SCNC: 9.1 MG/DL (ref 8.6–10.5)
CHLORIDE SERPL-SCNC: 97 MMOL/L (ref 98–107)
CO2 SERPL-SCNC: 24.8 MMOL/L (ref 22–29)
CREAT SERPL-MCNC: 0.73 MG/DL (ref 0.57–1)
CREAT UR-MCNC: 153.7 MG/DL
EGFRCR SERPLBLD CKD-EPI 2021: 91.4 ML/MIN/1.73
GLOBULIN UR ELPH-MCNC: 2.4 GM/DL
GLUCOSE SERPL-MCNC: 73 MG/DL (ref 65–99)
HBA1C MFR BLD: 5.6 % (ref 4.8–5.6)
MICROALBUMIN/CREAT UR: 12.4 MG/G (ref 0–29)
POTASSIUM SERPL-SCNC: 4.2 MMOL/L (ref 3.5–5.2)
PROT SERPL-MCNC: 6.7 G/DL (ref 6–8.5)
SODIUM SERPL-SCNC: 134 MMOL/L (ref 136–145)

## 2024-03-03 NOTE — ASSESSMENT & PLAN NOTE
Unable to update PFTs due to COVID19 pandemic and lack of supplies; patient is stable/asx on symbicort 80/4.5 2 puffs BID #3, 3RF, gargle/spit after puffs

## 2024-03-03 NOTE — ASSESSMENT & PLAN NOTE
Health maintenance - COVID19 vacc 11/23; flu 11/23; RSV 8/23; PVX 3/20; Prev20 11/22; Td 10/21 (next Td due 2031); HAV and Shingrix done; mammo 6/1/23; no further Paps unless abnlities; DEXA 7/23, repeat 2026 colonosc 7/25/17, repeat 2027 per Dr. Stewart; eye exam summer 2023; dental exam q6 mos; (+) seat belt use    Consultants:  Patient Care Team:  Alia Limon MD as PCP - General  [facility] as Consulting Physician (Colon and Rectal Surgery)  Hermilo Obando MD as Consulting Physician (Endocrinology)  Zita Limon (Optometry)

## 2024-03-03 NOTE — PROGRESS NOTES
ANNUAL WELLNESS VISIT    DRUG AND ALCOHOL USE      no tobacco use, alcohol intake:1 glasses of wine per month, and caffeine intake: 2 cups of caffeinated coffee per day, 1 cup caffeinated tea per week     DIET AND PHYSICAL ACTIVITY     Diet: general    Exercise: daily   Exercise Details: walking, aerobics, strength training,    MOOD DISORDER AND COGNITIVE SCREENING     PHQ-2 Depression Screening - components reviewed with patient; screening is negative for active depression.    Little interest or pleasure in doing things? 0-->not at all   Feeling down, depressed, or hopeless? 0   PHQ-2 Total Score 0      Anxiety Screening Tool Used YES     AUDIT screening  1     Mini-Cog Performed   Yes    1. Tell Patient 3 Words Car house boat    2. Administer Clock Test normal    3. Recall 3 words  Car house boat     4. Number Correct Items 3    FUNCTIONAL ABILITY AND LEVEL OF SAFETY   Hearing moderate hearing loss     Wears Hearing Aids No       Current Activities Independent      none  - see Funct/Cog Status Intake     Fall Risk Assessment       Has difficulty with walking or balance  No         Timed Up and Go (TUG) Test  10 sec.       If >12 sec, normal    ADVANCED DIRECTIVE  Advance Care Planning   ACP discussion was held with the patient during this visit. Patient does not have an advance directive, information provided.  Advance Care Planning Discussion:  Patient does not have advanced directive and living will.  Reviewed desires for end of life care, which is to have comfort care.  Patient does not want extraordinary life-sustaining measures, including no prolonged artificial life support. Encouraged patient to ensure family is aware of desires/preferences. Confirmed  is her healthcare surrogate.    PAIN SCREENING Do you have pain right now? no      If so, 1-10 scale: 0     Intermittent     Do you have pain every day? No      Probable chronic pain: No     Recent Hospitalizations:  No hospitalization(s) within the  last year..     MEDICATION REVIEW   - updated and reviewed (see Medication List).   - reviewed for potentially harmful drug-disease interactions in the elderly.   - reviewed for high risk medications in the elderly.   - aspirin use: No    BMI  Body mass index is 22.48 kg/m².  BMI is within normal parameters. No other follow-up for BMI required.       _________________________________________________________    Chief Complaint   Patient presents with    Welcome To Medicare    Hypertension    Impaired fasting glucose       History of Present Illness  63 y.o.  female presents for Welcome to [facility] PE and f/u on BP, sugars, and cholesterol.  Has not had any low blood pressures.    Reports cold symptoms started about 2 days ago with sore throat and drainage.  Has not had any fevers, cough, headaches, shortness of breath.  Multiple family members have been ill including brother coughing up blood and mother getting treated for presumptive pneumonia.  Patient has added antihistamine and remains on nasal spray, Mely pot, as well as Symbicort.    Review of Systems  Denies headaches, visual changes, CP, palpitations, SOB, cough, abd pain, n/v/d, difficulty with urination, numbness/tingling, falls, mood changes, lightheadedness, hearing changes, rashes.  ROS (+) for sore throat and congestion with some drainage, but no cough, fever/chills, nausea/vomiting.  Denies vaginal discharge or bleeding (no periods) or breast concerns.    All other ROS reviewed and negative.    Current Outpatient Medications:     albuterol sulfate HFA (PROAIR HFA) 108 (90 Base) MCG/ACT inhaler prn    atorvastatin 10 MG QD    budesonide-formoterol (Symbicort) 80-4.5 MCG/ACT 2 puff BID    Cholecalciferol (VITAMIN D-3) 1000 UNITS QD    fluticasone (FLONASE) 50 MCG/ACT nasal spray AD    irbesartan 150 MG QD    Naproxen Sodium 220 MG prn    OPIOID SCREENING:  The patient does not have opioid prescription on her medication list. Medication list has  "been reviewed with the patient regarding potentially high risk medications and harmful drug interactions in patients over age 65.    VITALS:  /78   Pulse 67   Temp 99 °F (37.2 °C)   Ht 178.4 cm (70.25\")   Wt 71.6 kg (157 lb 12.8 oz)   SpO2 98%   BMI 22.48 kg/m²     Vision Screening    Right eye Left eye Both eyes   Without correction 20/25 20/30 20/20   With correction 20/13 20/13 20/13       Physical Exam  Vitals and nursing note reviewed.   Constitutional:       General: She is not in acute distress.     Appearance: Normal appearance. She is well-developed. She is not ill-appearing.      Comments: Does not sound congested   HENT:      Head: Normocephalic.      Right Ear: Ear canal and external ear normal.      Left Ear: Ear canal and external ear normal.      Ears:      Comments: Chronically no hearing left ear, closed; intact hearing to finger rubbing on right     Nose: Congestion (mod mucosal swelling L>R without erythema or purulence) present.      Mouth/Throat:      Pharynx: Posterior oropharyngeal erythema (minimal erythema at pharyngeal arches bilaterally without swelling or exudate) present.      Comments: Posterior drainage o/p without cobblestoning  Eyes:      Extraocular Movements: Extraocular movements intact.      Conjunctiva/sclera: Conjunctivae normal.      Pupils: Pupils are equal, round, and reactive to light.   Neck:      Vascular: No carotid bruit (bilaterally).   Cardiovascular:      Rate and Rhythm: Normal rate and regular rhythm.      Heart sounds: Normal heart sounds.   Pulmonary:      Effort: Pulmonary effort is normal. No respiratory distress.      Breath sounds: Normal breath sounds. No wheezing, rhonchi or rales.   Abdominal:      General: Bowel sounds are normal. There is no distension.      Palpations: Abdomen is soft. There is no mass.      Tenderness: There is no abdominal tenderness.   Genitourinary:     Comments: Chaperone declined by patient.    Breast exam unremarkable " without masses, skin changes, nipple discharge, or axillary adenopathy.      Musculoskeletal:      Cervical back: Normal range of motion and neck supple.      Right lower leg: No edema.      Left lower leg: No edema.   Lymphadenopathy:      Cervical: No cervical adenopathy.   Skin:     General: Skin is warm and dry.      Findings: No rash.   Neurological:      Mental Status: She is alert and oriented to person, place, and time.      Gait: Gait normal.   Psychiatric:         Mood and Affect: Mood normal.         Behavior: Behavior normal.         LABS  Results for orders placed or performed in visit on 03/04/24   POCT rapid strep A    Specimen: Swab   Result Value Ref Range    Rapid Strep A Screen Negative Negative, VALID, INVALID, Not Performed    Internal Control Passed Passed    Lot Number [id number]     Expiration Date 04/14/2024    POCT SARS-CoV-2 + Flu Antigen ANA    Specimen: Swab   Result Value Ref Range    SARS Antigen Not Detected Not Detected, Presumptive Negative    Influenza A Antigen ANA Not Detected Not Detected    Influenza B Antigen ANA Not Detected Not Detected    Internal Control Passed Passed    Lot Number [id number]     Expiration Date 02/15/2025      2/21/24 stable CBC, CMP (FG 73), A1C 5.6 , TFTs, lipids (TC 1-93, TG 56, , LDL 81), UA, microalb, vit D 33.2    11/23 A1C 6.0      ECG 12 Lead    Date/Time: 3/4/2024 1:47 PM  Performed by: Alia Limon MD    Authorized by: Alia Limon MD  Comparison: compared with previous ECG from 5/19/2023  Similar to previous ECG  Rhythm: sinus rhythm  Rate: normal  BPM: 61  Conduction: conduction normal  ST Segments: ST segments normal  T Waves: T waves normal  QRS axis: normal  Clinical impression comment: stable EKG            ASSESSMENT/PLAN    Diagnoses and all orders for this visit:    1. Welcome to Medicare preventive visit (Primary)  Assessment & Plan:  Health maintenance - COVID19 vacc 11/23; flu 11/23; RSV 8/23; PVX 3/20; Prev20 11/22; Td 10/21  (next Td due 2031); HAV and Shingrix done; mammo 6/1/23; no further Paps unless abnlities; DEXA 7/23, repeat 2026 colonosc 7/25/17, repeat 2027 per Dr. Stewart; eye exam summer 2023; dental exam q6 mos; (+) seat belt use    Consultants:  Patient Care Team:  Alia Limon MD as PCP - General  Terry, Norman SAM MD as Consulting Physician (Colon and Rectal Surgery)  Hermilo Obando MD as Consulting Physician (Endocrinology)  Zita Limon (Optometry)        2. Hypertension  Assessment & Plan:  BP stable 120/78; cont irbesartan 150mg QD #90, 3RF        Orders:  -     irbesartan (AVAPRO) 150 MG tablet; Take 1 tablet by mouth Daily.  Dispense: 90 tablet; Refill: 3  -     ECG 12 Lead    3. Hyperlipidemia  Assessment & Plan:  Lipids stable with LDL 81; cont atorvastatin 10mg QD #90, 3RF    Orders:  -     atorvastatin (LIPITOR) 10 MG tablet; Take 1 tablet by mouth Every Night.  Dispense: 90 tablet; Refill: 3    4. Impaired fasting glucose  Assessment & Plan:  BG control stable/improved with A1C 5.6; encouraged reg phys activity to decr insulin resistance, moderation in unhealthy starches/sweets; f/u A1C in 6 mos        5. Vitamin D deficiency  Assessment & Plan:  Stable vit D level 33.2; cont 1000 units QD supplementation      6. Osteopenia  Assessment & Plan:  Calcium and vitamin D supplementation with weight-bearing exercise; DEXA 7/23, repeat 2026         7. Mild persistent asthma without complication  Assessment & Plan:  Unable to update PFTs due to COVID19 pandemic and lack of supplies; patient is stable/asx on symbicort 80/4.5 2 puffs BID #3, 3RF, gargle/spit after puffs      Orders:  -     budesonide-formoterol (Symbicort) 80-4.5 MCG/ACT inhaler; Inhale 2 puffs 2 (Two) Times a Day. Gargle and spit after puffs  Dispense: 3 each; Refill: 3    8. Sore throat  Comments:  (+) sick contacts; neg strep; rec hot tea, prn lozenges, antihistamine, lots of H2O; prn tylenol or NSAIDs  Orders:  -     POCT rapid strep  A    9. Congestion of nasal sinus  Comments:  agree with addition of antihistamine, cont netipot and nasal steroid spray 2/sprays/nostril QD x 2wls  Orders:  -     POCT SARS-CoV-2 + Flu Antigen ANA        FOLLOW-UP  RTC 6 mos with A1C    Electronically signed by:    Alia Limon MD, FACP  03/04/2024      EMR Dragon/Transcription Utilization  Please note that portions of this note were completed with a voice recognition program.

## 2024-03-04 ENCOUNTER — OFFICE VISIT (OUTPATIENT)
Dept: INTERNAL MEDICINE | Facility: CLINIC | Age: 64
End: 2024-03-04
Payer: MEDICARE

## 2024-03-04 VITALS
WEIGHT: 157.8 LBS | SYSTOLIC BLOOD PRESSURE: 120 MMHG | DIASTOLIC BLOOD PRESSURE: 78 MMHG | HEART RATE: 67 BPM | BODY MASS INDEX: 22.59 KG/M2 | TEMPERATURE: 99 F | HEIGHT: 70 IN | OXYGEN SATURATION: 98 %

## 2024-03-04 DIAGNOSIS — Z00.00 WELCOME TO MEDICARE PREVENTIVE VISIT: Primary | ICD-10-CM

## 2024-03-04 DIAGNOSIS — J45.30 MILD PERSISTENT ASTHMA WITHOUT COMPLICATION: Chronic | ICD-10-CM

## 2024-03-04 DIAGNOSIS — J02.9 SORE THROAT: ICD-10-CM

## 2024-03-04 DIAGNOSIS — E55.9 VITAMIN D DEFICIENCY: Chronic | ICD-10-CM

## 2024-03-04 DIAGNOSIS — R09.81 CONGESTION OF NASAL SINUS: ICD-10-CM

## 2024-03-04 DIAGNOSIS — E78.00 PURE HYPERCHOLESTEROLEMIA: Chronic | ICD-10-CM

## 2024-03-04 DIAGNOSIS — R73.01 IMPAIRED FASTING GLUCOSE: Chronic | ICD-10-CM

## 2024-03-04 DIAGNOSIS — M85.89 OSTEOPENIA OF MULTIPLE SITES: Chronic | ICD-10-CM

## 2024-03-04 DIAGNOSIS — I10 PRIMARY HYPERTENSION: Chronic | ICD-10-CM

## 2024-03-04 LAB
EXPIRATION DATE: NORMAL
EXPIRATION DATE: NORMAL
FLUAV AG UPPER RESP QL IA.RAPID: NOT DETECTED
FLUBV AG UPPER RESP QL IA.RAPID: NOT DETECTED
INTERNAL CONTROL: NORMAL
INTERNAL CONTROL: NORMAL
Lab: NORMAL
Lab: NORMAL
S PYO AG THROAT QL: NEGATIVE
SARS-COV-2 AG UPPER RESP QL IA.RAPID: NOT DETECTED

## 2024-03-04 RX ORDER — BUDESONIDE AND FORMOTEROL FUMARATE DIHYDRATE 80; 4.5 UG/1; UG/1
2 AEROSOL RESPIRATORY (INHALATION)
Qty: 3 EACH | Refills: 3 | Status: SHIPPED | OUTPATIENT
Start: 2024-03-04

## 2024-03-04 RX ORDER — ATORVASTATIN CALCIUM 10 MG/1
10 TABLET, FILM COATED ORAL NIGHTLY
Qty: 90 TABLET | Refills: 3 | Status: SHIPPED | OUTPATIENT
Start: 2024-03-04

## 2024-03-04 RX ORDER — IRBESARTAN 150 MG/1
150 TABLET ORAL DAILY
Qty: 90 TABLET | Refills: 3 | Status: SHIPPED | OUTPATIENT
Start: 2024-03-04

## 2024-06-17 ENCOUNTER — TRANSCRIBE ORDERS (OUTPATIENT)
Dept: ADMINISTRATIVE | Facility: HOSPITAL | Age: 64
End: 2024-06-17
Payer: MEDICARE

## 2024-06-17 DIAGNOSIS — Z12.31 VISIT FOR SCREENING MAMMOGRAM: Primary | ICD-10-CM

## 2024-06-23 LAB
NCCN CRITERIA FLAG: NORMAL
TYRER CUZICK SCORE: 7.4

## 2024-06-25 ENCOUNTER — HOSPITAL ENCOUNTER (OUTPATIENT)
Dept: MAMMOGRAPHY | Facility: HOSPITAL | Age: 64
Discharge: HOME | End: 2024-06-25
Admitting: INTERNAL MEDICINE
Payer: MEDICARE

## 2024-06-25 DIAGNOSIS — Z12.31 VISIT FOR SCREENING MAMMOGRAM: ICD-10-CM

## 2024-06-25 PROCEDURE — 77063 BREAST TOMOSYNTHESIS BI: CPT

## 2024-06-25 PROCEDURE — 77067 SCR MAMMO BI INCL CAD: CPT

## 2024-07-25 NOTE — ASSESSMENT & PLAN NOTE
BG control stable with A1C 5.6; encouraged reg phys activity to decr insulin resistance, moderation in unhealthy starches/sweets; f/u A1C in 6 mos     Received a call from the AL nurse reporting they were expecting orders for Debrox from PCP yesterday, but didn't receive an order and no script sent to pharmacy    Order sent for debrox 5 drops to both ears bid X 5 days for ceruminosis    SRIDHAR Domingo CNP on 7/25/2024 at 6:01 PM

## 2024-09-02 NOTE — PROGRESS NOTES
"Chief Complaint   Patient presents with    Impaired fasting glucose    Hypertension       History of Present Illness  64 y.o.  female presents for sugar follow-up.    2 mornings ago, missed 2 steps and fell, scraping her left forearm down the stairs.  The entire length of the forearm is scraped, but she has covered it with nonadhesive dressing and notes no significant bleeding and only minimal pain.  Has been using antibiotic ointment.    Review of Systems  Denies CP, SOB, falls. ROS (+) for significant left forearm abrasion as above.  No fevers.  All other ROS reviewed and negative.      Current Outpatient Medications:     albuterol sulfate HFA (PROAIR HFA) 108 (90 Base) MCG/ACT inhale prn    atorvastatin 10 MG QD    budesonide-formoterol (Symbicort) 80-4.5 MCG/ACT inhale 2 puffs BID    Cholecalciferol (VITAMIN D-3) 1000 UNITS QD    fluticasone (FLONASE) 50 MCG/ACT nasal spray AD    irbesartan 150 MG QD    Naproxen Sodium 220 MG prn    VITALS:  /70   Pulse 63   Ht 177.8 cm (70\")   Wt 73.3 kg (161 lb 9.6 oz)   SpO2 98%   BMI 23.19 kg/m²     Physical Exam  Vitals and nursing note reviewed.   Constitutional:       General: She is not in acute distress.     Appearance: Normal appearance. She is not ill-appearing.   Eyes:      Extraocular Movements: Extraocular movements intact.      Conjunctiva/sclera: Conjunctivae normal.   Pulmonary:      Effort: Pulmonary effort is normal. No respiratory distress.   Neurological:      Mental Status: She is alert. Mental status is at baseline.   Psychiatric:         Mood and Affect: Mood normal.         Behavior: Behavior normal.         LABS  Results for orders placed or performed in visit on 09/05/24   POC Glycosylated Hemoglobin (Hb A1C)    Specimen: Blood   Result Value Ref Range    Hemoglobin A1C 5.9 (A) 4.5 - 5.7 %    Lot Number [id number]     Expiration Date 05/23/2026 2/21/24 A1C 5.6    ASSESSMENT/PLAN    Diagnoses and all orders for this visit:    1. " Impaired fasting glucose (Primary)  Assessment & Plan:  BG control stable with A1C 5.9; encouraged reg phys activity to decr insulin resistance, moderation in unhealthy starches/sweets; f/u A1C in 6 mos      Orders:  -     POC Glycosylated Hemoglobin (Hb A1C)    2. Hypertension  Assessment & Plan:  BP stable 100/70; cont irbesartan 150mg QD      3. Abrasion of left forearm, initial encounter  Comments:  s/p fall, wound is clean, cont abx ointment; f/u if develops erythema/swelling    4. Vaccine counseling  Comments:  rec flu vacc and COVID19 vacc in Sept/Oct        FOLLOW-UP  Health maintenance - rec flu vacc and COVID19 vacc in the fall; RSV vacc completed; re: abrasion wound, Td 10/21  RTC for init AWV 3/7/25; fasting labs prior to appt (CBC, CMP, TSH, lipids, UA/micr, A1C, vit D, CPK, FT4)    Electronically signed by:    Alia Limon MD, FACP  09/05/2024

## 2024-09-05 ENCOUNTER — OFFICE VISIT (OUTPATIENT)
Dept: INTERNAL MEDICINE | Facility: CLINIC | Age: 64
End: 2024-09-05
Payer: MEDICARE

## 2024-09-05 VITALS
HEIGHT: 70 IN | DIASTOLIC BLOOD PRESSURE: 70 MMHG | WEIGHT: 161.6 LBS | OXYGEN SATURATION: 98 % | HEART RATE: 63 BPM | SYSTOLIC BLOOD PRESSURE: 100 MMHG | BODY MASS INDEX: 23.13 KG/M2

## 2024-09-05 DIAGNOSIS — I10 PRIMARY HYPERTENSION: Chronic | ICD-10-CM

## 2024-09-05 DIAGNOSIS — R73.01 IMPAIRED FASTING GLUCOSE: Primary | Chronic | ICD-10-CM

## 2024-09-05 DIAGNOSIS — Z71.85 VACCINE COUNSELING: ICD-10-CM

## 2024-09-05 DIAGNOSIS — S50.812A ABRASION OF LEFT FOREARM, INITIAL ENCOUNTER: ICD-10-CM

## 2024-09-05 LAB
EXPIRATION DATE: ABNORMAL
HBA1C MFR BLD: 5.9 % (ref 4.5–5.7)
Lab: ABNORMAL

## 2024-09-05 PROCEDURE — 3044F HG A1C LEVEL LT 7.0%: CPT | Performed by: INTERNAL MEDICINE

## 2024-09-05 PROCEDURE — 1126F AMNT PAIN NOTED NONE PRSNT: CPT | Performed by: INTERNAL MEDICINE

## 2024-09-05 PROCEDURE — 1159F MED LIST DOCD IN RCRD: CPT | Performed by: INTERNAL MEDICINE

## 2024-09-05 PROCEDURE — 1160F RVW MEDS BY RX/DR IN RCRD: CPT | Performed by: INTERNAL MEDICINE

## 2024-09-05 PROCEDURE — G2211 COMPLEX E/M VISIT ADD ON: HCPCS | Performed by: INTERNAL MEDICINE

## 2024-09-05 PROCEDURE — 3078F DIAST BP <80 MM HG: CPT | Performed by: INTERNAL MEDICINE

## 2024-09-05 PROCEDURE — 83036 HEMOGLOBIN GLYCOSYLATED A1C: CPT | Performed by: INTERNAL MEDICINE

## 2024-09-05 PROCEDURE — 99214 OFFICE O/P EST MOD 30 MIN: CPT | Performed by: INTERNAL MEDICINE

## 2024-09-05 PROCEDURE — 3074F SYST BP LT 130 MM HG: CPT | Performed by: INTERNAL MEDICINE

## 2025-02-13 DIAGNOSIS — E55.9 VITAMIN D DEFICIENCY: Chronic | ICD-10-CM

## 2025-02-13 DIAGNOSIS — Z00.00 INITIAL MEDICARE ANNUAL WELLNESS VISIT: Primary | ICD-10-CM

## 2025-02-13 DIAGNOSIS — R73.01 IMPAIRED FASTING GLUCOSE: Chronic | ICD-10-CM

## 2025-02-13 DIAGNOSIS — E78.00 PURE HYPERCHOLESTEROLEMIA: Chronic | ICD-10-CM

## 2025-04-01 DIAGNOSIS — I10 PRIMARY HYPERTENSION: Chronic | ICD-10-CM

## 2025-04-01 DIAGNOSIS — J45.30 MILD PERSISTENT ASTHMA WITHOUT COMPLICATION: Chronic | ICD-10-CM

## 2025-04-01 RX ORDER — BUDESONIDE AND FORMOTEROL FUMARATE DIHYDRATE 80; 4.5 UG/1; UG/1
2 AEROSOL RESPIRATORY (INHALATION)
Qty: 1 EACH | Refills: 0 | Status: SHIPPED | OUTPATIENT
Start: 2025-04-01

## 2025-04-01 RX ORDER — BUDESONIDE AND FORMOTEROL FUMARATE DIHYDRATE 80; 4.5 UG/1; UG/1
AEROSOL RESPIRATORY (INHALATION)
Qty: 30.6 G | Refills: 3 | OUTPATIENT
Start: 2025-04-01

## 2025-04-01 RX ORDER — IRBESARTAN 150 MG/1
150 TABLET ORAL DAILY
Qty: 30 TABLET | Refills: 0 | Status: SHIPPED | OUTPATIENT
Start: 2025-04-01

## 2025-04-01 RX ORDER — IRBESARTAN 150 MG/1
150 TABLET ORAL DAILY
Qty: 90 TABLET | OUTPATIENT
Start: 2025-04-01

## 2025-04-30 DIAGNOSIS — E78.00 PURE HYPERCHOLESTEROLEMIA: Chronic | ICD-10-CM

## 2025-04-30 RX ORDER — ATORVASTATIN CALCIUM 10 MG/1
10 TABLET, FILM COATED ORAL NIGHTLY
Qty: 15 TABLET | Refills: 0 | Status: SHIPPED | OUTPATIENT
Start: 2025-04-30

## 2025-04-30 RX ORDER — ATORVASTATIN CALCIUM 10 MG/1
10 TABLET, FILM COATED ORAL NIGHTLY
Qty: 15 TABLET | Refills: 0 | OUTPATIENT
Start: 2025-04-30

## 2025-05-05 ENCOUNTER — TELEPHONE (OUTPATIENT)
Dept: INTERNAL MEDICINE | Facility: CLINIC | Age: 65
End: 2025-05-05
Payer: MEDICARE

## 2025-05-05 NOTE — TELEPHONE ENCOUNTER
Left VM to call office. Dr. JOHNSON will be out of the office on 5/13/25 in the afternoon. Calling to see if she will move her appt from 2:00 to 12:00 pm instead on the same day.

## 2025-05-06 ENCOUNTER — LAB (OUTPATIENT)
Dept: LAB | Facility: HOSPITAL | Age: 65
End: 2025-05-06
Payer: MEDICARE

## 2025-05-06 DIAGNOSIS — R73.01 IMPAIRED FASTING GLUCOSE: Chronic | ICD-10-CM

## 2025-05-06 DIAGNOSIS — Z00.00 INITIAL MEDICARE ANNUAL WELLNESS VISIT: ICD-10-CM

## 2025-05-06 DIAGNOSIS — E55.9 VITAMIN D DEFICIENCY: Chronic | ICD-10-CM

## 2025-05-06 DIAGNOSIS — E78.00 PURE HYPERCHOLESTEROLEMIA: Chronic | ICD-10-CM

## 2025-05-06 LAB
25(OH)D3 SERPL-MCNC: 37.1 NG/ML (ref 30–100)
ALBUMIN SERPL-MCNC: 4.4 G/DL (ref 3.5–5.2)
ALBUMIN/GLOB SERPL: 1.6 G/DL
ALP SERPL-CCNC: 67 U/L (ref 39–117)
ALT SERPL W P-5'-P-CCNC: 17 U/L (ref 1–33)
ANION GAP SERPL CALCULATED.3IONS-SCNC: 10.8 MMOL/L (ref 5–15)
AST SERPL-CCNC: 25 U/L (ref 1–32)
BACTERIA UR QL AUTO: NORMAL /HPF
BASOPHILS # BLD AUTO: 0.05 10*3/MM3 (ref 0–0.2)
BASOPHILS NFR BLD AUTO: 0.8 % (ref 0–1.5)
BILIRUB SERPL-MCNC: 0.5 MG/DL (ref 0–1.2)
BILIRUB UR QL STRIP: NEGATIVE
BUN SERPL-MCNC: 8 MG/DL (ref 8–23)
BUN/CREAT SERPL: 9.1 (ref 7–25)
CALCIUM SPEC-SCNC: 9.5 MG/DL (ref 8.6–10.5)
CHLORIDE SERPL-SCNC: 98 MMOL/L (ref 98–107)
CHOLEST SERPL-MCNC: 196 MG/DL (ref 0–200)
CK SERPL-CCNC: 139 U/L (ref 20–180)
CLARITY UR: CLEAR
CO2 SERPL-SCNC: 26.2 MMOL/L (ref 22–29)
COLOR UR: ABNORMAL
CREAT SERPL-MCNC: 0.88 MG/DL (ref 0.57–1)
DEPRECATED RDW RBC AUTO: 43 FL (ref 37–54)
EGFRCR SERPLBLD CKD-EPI 2021: 72.6 ML/MIN/1.73
EOSINOPHIL # BLD AUTO: 0.44 10*3/MM3 (ref 0–0.4)
EOSINOPHIL NFR BLD AUTO: 7.4 % (ref 0.3–6.2)
ERYTHROCYTE [DISTWIDTH] IN BLOOD BY AUTOMATED COUNT: 13 % (ref 12.3–15.4)
GLOBULIN UR ELPH-MCNC: 2.8 GM/DL
GLUCOSE SERPL-MCNC: 90 MG/DL (ref 65–99)
GLUCOSE UR STRIP-MCNC: NEGATIVE MG/DL
HBA1C MFR BLD: 5.7 % (ref 4.8–5.6)
HCT VFR BLD AUTO: 39.2 % (ref 34–46.6)
HDLC SERPL-MCNC: 103 MG/DL (ref 40–60)
HGB BLD-MCNC: 12.5 G/DL (ref 12–15.9)
HGB UR QL STRIP.AUTO: NEGATIVE
HYALINE CASTS UR QL AUTO: NORMAL /LPF
IMM GRANULOCYTES # BLD AUTO: 0.02 10*3/MM3 (ref 0–0.05)
IMM GRANULOCYTES NFR BLD AUTO: 0.3 % (ref 0–0.5)
KETONES UR QL STRIP: NEGATIVE
LDLC SERPL CALC-MCNC: 83 MG/DL (ref 0–100)
LDLC/HDLC SERPL: 0.8 {RATIO}
LEUKOCYTE ESTERASE UR QL STRIP.AUTO: ABNORMAL
LYMPHOCYTES # BLD AUTO: 2.15 10*3/MM3 (ref 0.7–3.1)
LYMPHOCYTES NFR BLD AUTO: 36.1 % (ref 19.6–45.3)
MCH RBC QN AUTO: 29.1 PG (ref 26.6–33)
MCHC RBC AUTO-ENTMCNC: 31.9 G/DL (ref 31.5–35.7)
MCV RBC AUTO: 91.2 FL (ref 79–97)
MONOCYTES # BLD AUTO: 0.57 10*3/MM3 (ref 0.1–0.9)
MONOCYTES NFR BLD AUTO: 9.6 % (ref 5–12)
NEUTROPHILS NFR BLD AUTO: 2.73 10*3/MM3 (ref 1.7–7)
NEUTROPHILS NFR BLD AUTO: 45.8 % (ref 42.7–76)
NITRITE UR QL STRIP: NEGATIVE
NRBC BLD AUTO-RTO: 0 /100 WBC (ref 0–0.2)
PH UR STRIP.AUTO: 6.5 [PH] (ref 5–8)
PLATELET # BLD AUTO: 369 10*3/MM3 (ref 140–450)
PMV BLD AUTO: 9.5 FL (ref 6–12)
POTASSIUM SERPL-SCNC: 4 MMOL/L (ref 3.5–5.2)
PROT SERPL-MCNC: 7.2 G/DL (ref 6–8.5)
PROT UR QL STRIP: ABNORMAL
RBC # BLD AUTO: 4.3 10*6/MM3 (ref 3.77–5.28)
RBC # UR STRIP: NORMAL /HPF
REF LAB TEST METHOD: NORMAL
SODIUM SERPL-SCNC: 135 MMOL/L (ref 136–145)
SP GR UR STRIP: 1.02 (ref 1–1.03)
SQUAMOUS #/AREA URNS HPF: NORMAL /HPF
T4 FREE SERPL-MCNC: 1.28 NG/DL (ref 0.92–1.68)
TRIGL SERPL-MCNC: 55 MG/DL (ref 0–150)
TSH SERPL DL<=0.05 MIU/L-ACNC: 3.24 UIU/ML (ref 0.27–4.2)
UROBILINOGEN UR QL STRIP: ABNORMAL
VLDLC SERPL-MCNC: 10 MG/DL (ref 5–40)
WBC # UR STRIP: NORMAL /HPF
WBC NRBC COR # BLD AUTO: 5.96 10*3/MM3 (ref 3.4–10.8)

## 2025-05-06 PROCEDURE — 82306 VITAMIN D 25 HYDROXY: CPT

## 2025-05-06 PROCEDURE — 84439 ASSAY OF FREE THYROXINE: CPT

## 2025-05-06 PROCEDURE — 80053 COMPREHEN METABOLIC PANEL: CPT

## 2025-05-06 PROCEDURE — 84443 ASSAY THYROID STIM HORMONE: CPT

## 2025-05-06 PROCEDURE — 81001 URINALYSIS AUTO W/SCOPE: CPT

## 2025-05-06 PROCEDURE — 80061 LIPID PANEL: CPT

## 2025-05-06 PROCEDURE — 83036 HEMOGLOBIN GLYCOSYLATED A1C: CPT

## 2025-05-06 PROCEDURE — 82550 ASSAY OF CK (CPK): CPT

## 2025-05-06 PROCEDURE — 85025 COMPLETE CBC W/AUTO DIFF WBC: CPT

## 2025-05-06 NOTE — TELEPHONE ENCOUNTER
"Lvm for patient stating the first appointment Dr Limon has is 1/16 at 3:30, Dr Limon would like her to be seen to discuss bp medication options and advised her to do a low sodium in her diet and increased aearobic activity will help her blood pressure.    \"HUB TO RELAY AND SCHEDULE APPOINTMENT \"  " - Much improved on 5/4 and 5/5. Resolved on 5/6.   - Present on admit. Patient complaining of redness to her eyes and appears she has a viral conjunctivitis to both eyes and artifical tears ordered to help with dryness. Patient advised not to rub her eyes.

## 2025-05-12 NOTE — PROGRESS NOTES
ANNUAL WELLNESS VISIT    DRUG AND ALCOHOL USE      no tobacco use, alcohol intake:1 glasses of wine per weeks, and caffeine intake: 4 cups of caffeinated coffee per day    DIET AND PHYSICAL ACTIVITY     Diet: general, low salt    Exercise: frequently   Exercise Details: walking, stretching/yoga, strength training    MOOD DISORDER AND COGNITIVE SCREENING     PHQ-2 Depression Screening - components reviewed with patient; screening is negative for active depression.    Little interest or pleasure in doing things? Not at all   Feeling down, depressed, or hopeless? Not at all   PHQ-2 Total Score 0       Anxiety Screening Tool Used YES     Mini-Cog Performed   Yes    1. Tell Patient 3 Words Ice fan door    2. Administer Clock Test normal    3. Recall 3 words  Ice fan door    4. Number Correct Items 3    FUNCTIONAL ABILITY AND LEVEL OF SAFETY   Hearing mild hearing loss -no hearing eft ear     Wears Hearing Aids No       Current Activities Independent      none  - see Funct/Cog Status Intake     Fall Risk Assessment       Has difficulty with walking or balance  No         Timed Up and Go (TUG) Test  8 sec.       If >12 sec, normal    ADVANCED DIRECTIVE  Advance Care Planning   ACP discussion was held with the patient during this visit. Patient does not have an advance directive, information provided.    PAIN SCREENING Do you have pain right now? yes      If so, 1-10 scale: 8     Constant     Do you have pain every day? Yes      Probable chronic pain: Yes  - at bilat knees    Recent Hospitalizations:  No hospitalization(s) within the last year..     MEDICATION REVIEW   - updated and reviewed (see Medication List).   - reviewed for potentially harmful drug-disease interactions in the elderly.   - reviewed for high risk medications in the elderly.   - aspirin use: No    BMI  Body mass index is 24.4 kg/m².  BMI is within normal parameters. No other follow-up for BMI required.    "  _______________________________________________________    Chief Complaint   Patient presents with    Medicare Wellness-Initial Visit    Hyperlipidemia    Hypertension    Impaired fasting glucose       History of Present Illness  64 y.o.  female presents for updated phys examination and wellness visit as well as f/u on BP, cholesterol, and sugars.    Reports home BPs 120s/70-80s. Thinks BP is elevated today bc she has tweaked her R upper back.    Reports suboptimal eating - has been helping take care of mother for last several months.    Complains of severe bilat knee pain    Review of Systems  Denies headaches, visual changes, CP, palpitations, SOB, cough, abd pain, n/v/d, difficulty with urination, numbness/tingling, falls, mood changes, lightheadedness, hearing changes, rashes.    All other ROS reviewed and negative.    Current Outpatient Medications:     albuterol sulfate HFA (PROAIR HFA) 108 (90 Base) MCG/ACT inhaler prn    atorvastatin 10 MG QD    budesonide-formoterol (Symbicort) 80-4.5 MCG/ACT inhaler BID    Cholecalciferol (VITAMIN D-3) 1000 UNITS QD    fluticasone (FLONASE) 50 MCG/ACT nasal spray AD    irbesartan 150 MG QD    OPIOID SCREENING:  The patient does not have opioid prescription on her medication list. Medication list has been reviewed with the patient regarding potentially high risk medications and harmful drug interactions in patients over age 65.    VITALS:  /72   Pulse 57   Ht 176.5 cm (69.5\")   Wt 76 kg (167 lb 9.6 oz)   SpO2 98%   BMI 24.40 kg/m²     Physical Exam  Vitals and nursing note reviewed.   Constitutional:       General: She is not in acute distress.     Appearance: Normal appearance. She is well-developed. She is not ill-appearing.   HENT:      Head: Normocephalic.      Nose: Nose normal.   Eyes:      Extraocular Movements: Extraocular movements intact.      Conjunctiva/sclera: Conjunctivae normal.      Pupils: Pupils are equal, round, and reactive to " light.   Neck:      Vascular: No carotid bruit (bilaterally).   Cardiovascular:      Rate and Rhythm: Normal rate and regular rhythm.      Heart sounds: Normal heart sounds.   Pulmonary:      Effort: Pulmonary effort is normal. No respiratory distress.      Breath sounds: Normal breath sounds. No wheezing, rhonchi or rales.   Abdominal:      General: Bowel sounds are normal. There is no distension.      Palpations: Abdomen is soft. There is no mass.      Tenderness: There is no abdominal tenderness.   Genitourinary:     Comments: Chaperone declined by patient.    Breast exam unremarkable without masses, skin changes, nipple discharge, or axillary adenopathy.      Musculoskeletal:         General: Swelling (bilat knee with mild swelling, tenderness under the patella with extension more than flexion bilaterally but FROM with (+) crepitus) present.      Cervical back: Normal range of motion and neck supple.      Right lower leg: No edema.      Left lower leg: No edema.   Lymphadenopathy:      Cervical: No cervical adenopathy.   Skin:     General: Skin is warm and dry.      Findings: No rash.   Neurological:      Mental Status: She is alert and oriented to person, place, and time.      Gait: Gait normal.   Psychiatric:         Mood and Affect: Mood normal.         Behavior: Behavior normal.         LABS  Results for orders placed or performed in visit on 05/06/25   Comprehensive Metabolic Panel    Collection Time: 05/06/25  9:43 AM    Specimen: Blood   Result Value Ref Range    Glucose 90 65 - 99 mg/dL    BUN 8 8 - 23 mg/dL    Creatinine 0.88 0.57 - 1.00 mg/dL    Sodium 135 (L) 136 - 145 mmol/L    Potassium 4.0 3.5 - 5.2 mmol/L    Chloride 98 98 - 107 mmol/L    CO2 26.2 22.0 - 29.0 mmol/L    Calcium 9.5 8.6 - 10.5 mg/dL    Total Protein 7.2 6.0 - 8.5 g/dL    Albumin 4.4 3.5 - 5.2 g/dL    ALT (SGPT) 17 1 - 33 U/L    AST (SGOT) 25 1 - 32 U/L    Alkaline Phosphatase 67 39 - 117 U/L    Total Bilirubin 0.5 0.0 - 1.2 mg/dL     Globulin 2.8 gm/dL    A/G Ratio 1.6 g/dL    BUN/Creatinine Ratio 9.1 7.0 - 25.0    Anion Gap 10.8 5.0 - 15.0 mmol/L    eGFR 72.6 >60.0 mL/min/1.73   Lipid Panel    Collection Time: 05/06/25  9:43 AM    Specimen: Blood   Result Value Ref Range    Total Cholesterol 196 0 - 200 mg/dL    Triglycerides 55 0 - 150 mg/dL    HDL Cholesterol 103 (H) 40 - 60 mg/dL    LDL Cholesterol  83 0 - 100 mg/dL    VLDL Cholesterol 10 5 - 40 mg/dL    LDL/HDL Ratio 0.80    TSH    Collection Time: 05/06/25  9:43 AM    Specimen: Blood   Result Value Ref Range    TSH 3.240 0.270 - 4.200 uIU/mL   Hemoglobin A1c    Collection Time: 05/06/25  9:43 AM    Specimen: Blood   Result Value Ref Range    Hemoglobin A1C 5.70 (H) 4.80 - 5.60 %   Vitamin D,25-Hydroxy    Collection Time: 05/06/25  9:43 AM    Specimen: Blood   Result Value Ref Range    25 Hydroxy, Vitamin D 37.1 30.0 - 100.0 ng/ml   T4, Free    Collection Time: 05/06/25  9:43 AM    Specimen: Blood   Result Value Ref Range    Free T4 1.28 0.92 - 1.68 ng/dL   CK    Collection Time: 05/06/25  9:43 AM    Specimen: Blood   Result Value Ref Range    Creatine Kinase 139 20 - 180 U/L   CBC Auto Differential    Collection Time: 05/06/25  9:43 AM    Specimen: Blood   Result Value Ref Range    WBC 5.96 3.40 - 10.80 10*3/mm3    RBC 4.30 3.77 - 5.28 10*6/mm3    Hemoglobin 12.5 12.0 - 15.9 g/dL    Hematocrit 39.2 34.0 - 46.6 %    MCV 91.2 79.0 - 97.0 fL    MCH 29.1 26.6 - 33.0 pg    MCHC 31.9 31.5 - 35.7 g/dL    RDW 13.0 12.3 - 15.4 %    RDW-SD 43.0 37.0 - 54.0 fl    MPV 9.5 6.0 - 12.0 fL    Platelets 369 140 - 450 10*3/mm3    Neutrophil % 45.8 42.7 - 76.0 %    Lymphocyte % 36.1 19.6 - 45.3 %    Monocyte % 9.6 5.0 - 12.0 %    Eosinophil % 7.4 (H) 0.3 - 6.2 %    Basophil % 0.8 0.0 - 1.5 %    Immature Grans % 0.3 0.0 - 0.5 %    Neutrophils, Absolute 2.73 1.70 - 7.00 10*3/mm3    Lymphocytes, Absolute 2.15 0.70 - 3.10 10*3/mm3    Monocytes, Absolute 0.57 0.10 - 0.90 10*3/mm3    Eosinophils, Absolute 0.44 (H)  0.00 - 0.40 10*3/mm3    Basophils, Absolute 0.05 0.00 - 0.20 10*3/mm3    Immature Grans, Absolute 0.02 0.00 - 0.05 10*3/mm3    nRBC 0.0 0.0 - 0.2 /100 WBC   Urinalysis without microscopic (no culture) - Urine, Clean Catch    Collection Time: 05/06/25  9:43 AM    Specimen: Urine, Clean Catch   Result Value Ref Range    Color, UA Dark Yellow (A) Yellow, Straw    Appearance, UA Clear Clear    pH, UA 6.5 5.0 - 8.0    Specific Gravity, UA 1.018 1.005 - 1.030    Glucose, UA Negative Negative    Ketones, UA Negative Negative    Bilirubin, UA Negative Negative    Blood, UA Negative Negative    Protein, UA Trace (A) Negative    Leuk Esterase, UA Trace (A) Negative    Nitrite, UA Negative Negative    Urobilinogen, UA 0.2 E.U./dL 0.2 - 1.0 E.U./dL   Urinalysis, Microscopic Only - Urine, Clean Catch    Collection Time: 05/06/25  9:43 AM    Specimen: Urine, Clean Catch   Result Value Ref Range    RBC, UA 0-2 None Seen, 0-2 /HPF    WBC, UA 0-2 None Seen, 0-2 /HPF    Bacteria, UA None Seen None Seen /HPF    Squamous Epithelial Cells, UA 0-2 None Seen, 0-2 /HPF    Hyaline Casts, UA None Seen None Seen /LPF    Methodology Automated Microscopy      9/5/24 A1C 5.9      ECG 12 Lead    Date/Time: 5/13/2025 12:30 PM  Performed by: Alia Limon MD    Authorized by: Alia Limon MD  Comparison: compared with previous ECG from 3/4/2024  Similar to previous ECG  Rhythm: sinus rhythm and sinus bradycardia  Rate: normal  BPM: 52  Conduction: conduction normal  ST Segments: ST segments normal  T Waves: T waves normal  QRS axis: normal  Clinical impression comment: stable EKG            ASSESSMENT/PLAN    Diagnoses and all orders for this visit:    1. Medicare annual wellness visit, initial (Primary)  Assessment & Plan:  Health maintenance - COVID19 vacc 11/24; flu 11/24; RSV 8/23; PVX 3/20; Prev20 11/22; Td 10/21; no further Paps unless abnlities; DEXA 7/23, repeat 2026 colonosc 7/25/17, repeat 2027 per Dr. Stewart; eye exam Qyr, pending  later this month; dental exam q6 mos; (+) seat belt use    Consultants:  Patient Care Team:  Alia Limon MD as PCP - Norman Khan MD as Consulting Physician (Colon and Rectal Surgery)  Hermilo Obando MD as Consulting Physician (Endocrinology)  Zita Limon (Optometry)        2. Impaired fasting glucose  Assessment & Plan:  BG control stable with A1C 5.7; encouraged reg phys activity to decr insulin resistance, moderation in unhealthy starches/sweets; f/u A1C in 6 mos        3. Hypertension  Assessment & Plan:  BP elevated, improved/normal on repeat and patient reports normotensive readings at home; cont irbesartan 150mg QD #90, 3RF; f/u in 6 mos    Orders:  -     irbesartan (AVAPRO) 150 MG tablet; Take 1 tablet by mouth Daily.  Dispense: 90 tablet; Refill: 3  -     ECG 12 Lead    4. Hyperlipidemia  Assessment & Plan:  Lipids stable with LDL 83; cont atorvastatin 10mg QD #90, 3RF    Orders:  -     atorvastatin (LIPITOR) 10 MG tablet; Take 1 tablet by mouth Every Night.  Dispense: 90 tablet; Refill: 3    5. Vitamin D deficiency  Assessment & Plan:  Stable vit D level 37.1; cont 1000 units QD supplementation      6. Osteopenia  Assessment & Plan:  Calcium and vitamin D supplementation with weight-bearing exercise; DEXA 7/23, repeat 2026         7. Mild persistent asthma without complication  Assessment & Plan:  No PFTs after COVID19 pandemic; stable/asx on symbicort 80/4.5 2 puffs BID #3, 3RF, gargle/spit after puffs      Orders:  -     budesonide-formoterol (Symbicort) 80-4.5 MCG/ACT inhaler; Inhale 2 puffs 2 (Two) Times a Day. Gargle and spit after puffs  Dispense: 3 each; Refill: 3    8. Osteoarthritis of both knees  Assessment & Plan:  Rec ice vs heat, topical therapies (already using voltaren gel), and prn NSAIDs; referral for PT (ELIZABETH Youssef) and discussed importance of HEP; f/u prn    Orders:  -     Ambulatory Referral to Physical Therapy for Evaluation &  Treatment        FOLLOW-UP  F/u if bilat knee pain does not improve with PT; plan to repeat Xrays at that time  RTC 6 mos with A1C and BP check    Electronically signed by:    Alia Limon MD, FACP  05/13/2025

## 2025-05-12 NOTE — ASSESSMENT & PLAN NOTE
No PFTs after COVID19 pandemic; stable/asx on symbicort 80/4.5 2 puffs BID #3, 3RF, gargle/spit after puffs

## 2025-05-12 NOTE — ASSESSMENT & PLAN NOTE
BP elevated, improved/normal on repeat and patient reports normotensive readings at home; cont irbesartan 150mg QD #90, 3RF; f/u in 6 mos

## 2025-05-12 NOTE — ASSESSMENT & PLAN NOTE
Health maintenance - COVID19 vacc 11/24; flu 11/24; RSV 8/23; PVX 3/20; Prev20 11/22; Td 10/21; no further Paps unless abnlities; DEXA 7/23, repeat 2026 colonosc 7/25/17, repeat 2027 per Dr. Stewart; eye exam Qyr, pending later this month; dental exam q6 mos; (+) seat belt use    Consultants:  Patient Care Team:  Alia Limon MD as PCP - [facility]  HarrietNorman MD as Consulting Physician (Colon and Rectal Surgery)  Hermilo Obando MD as Consulting Physician (Endocrinology)  Zita Limon (Optometry)

## 2025-05-13 ENCOUNTER — OFFICE VISIT (OUTPATIENT)
Dept: INTERNAL MEDICINE | Facility: CLINIC | Age: 65
End: 2025-05-13
Payer: MEDICARE

## 2025-05-13 VITALS
HEIGHT: 70 IN | DIASTOLIC BLOOD PRESSURE: 72 MMHG | WEIGHT: 167.6 LBS | BODY MASS INDEX: 23.99 KG/M2 | OXYGEN SATURATION: 98 % | HEART RATE: 57 BPM | SYSTOLIC BLOOD PRESSURE: 122 MMHG

## 2025-05-13 DIAGNOSIS — M17.0 PRIMARY OSTEOARTHRITIS OF BOTH KNEES: Chronic | ICD-10-CM

## 2025-05-13 DIAGNOSIS — J45.30 MILD PERSISTENT ASTHMA WITHOUT COMPLICATION: Chronic | ICD-10-CM

## 2025-05-13 DIAGNOSIS — M85.89 OSTEOPENIA OF MULTIPLE SITES: Chronic | ICD-10-CM

## 2025-05-13 DIAGNOSIS — R73.01 IMPAIRED FASTING GLUCOSE: Chronic | ICD-10-CM

## 2025-05-13 DIAGNOSIS — I10 PRIMARY HYPERTENSION: Chronic | ICD-10-CM

## 2025-05-13 DIAGNOSIS — E78.00 PURE HYPERCHOLESTEROLEMIA: Chronic | ICD-10-CM

## 2025-05-13 DIAGNOSIS — Z00.00 MEDICARE ANNUAL WELLNESS VISIT, INITIAL: Primary | ICD-10-CM

## 2025-05-13 DIAGNOSIS — E55.9 VITAMIN D DEFICIENCY: Chronic | ICD-10-CM

## 2025-05-13 RX ORDER — BUDESONIDE AND FORMOTEROL FUMARATE DIHYDRATE 80; 4.5 UG/1; UG/1
2 AEROSOL RESPIRATORY (INHALATION)
Qty: 3 EACH | Refills: 3 | Status: SHIPPED | OUTPATIENT
Start: 2025-05-13

## 2025-05-13 RX ORDER — ATORVASTATIN CALCIUM 10 MG/1
10 TABLET, FILM COATED ORAL NIGHTLY
Qty: 90 TABLET | Refills: 3 | Status: SHIPPED | OUTPATIENT
Start: 2025-05-13

## 2025-05-13 RX ORDER — IRBESARTAN 150 MG/1
150 TABLET ORAL DAILY
Qty: 90 TABLET | Refills: 3 | Status: SHIPPED | OUTPATIENT
Start: 2025-05-13

## 2025-05-14 NOTE — ASSESSMENT & PLAN NOTE
Rec ice vs heat, topical therapies (already using voltaren gel), and prn NSAIDs; referral for PT (ELIZABETH Youssef) and discussed importance of HEP; f/u prn

## 2025-06-03 DIAGNOSIS — I10 PRIMARY HYPERTENSION: Chronic | ICD-10-CM

## 2025-06-03 RX ORDER — IRBESARTAN 150 MG/1
150 TABLET ORAL DAILY
Qty: 30 TABLET | OUTPATIENT
Start: 2025-06-03

## 2025-08-08 ENCOUNTER — TRANSCRIBE ORDERS (OUTPATIENT)
Dept: ADMINISTRATIVE | Facility: HOSPITAL | Age: 65
End: 2025-08-08
Payer: MEDICARE

## 2025-08-08 DIAGNOSIS — Z12.31 VISIT FOR SCREENING MAMMOGRAM: Primary | ICD-10-CM

## 2025-08-21 ENCOUNTER — HOSPITAL ENCOUNTER (OUTPATIENT)
Dept: MAMMOGRAPHY | Facility: HOSPITAL | Age: 65
Discharge: HOME | End: 2025-08-21
Admitting: INTERNAL MEDICINE
Payer: MEDICARE

## 2025-08-21 DIAGNOSIS — Z12.31 VISIT FOR SCREENING MAMMOGRAM: ICD-10-CM

## 2025-08-21 LAB
NCCN CRITERIA FLAG: NORMAL
TYRER CUZICK SCORE: 7

## 2025-08-21 PROCEDURE — 77067 SCR MAMMO BI INCL CAD: CPT

## 2025-08-21 PROCEDURE — 77063 BREAST TOMOSYNTHESIS BI: CPT
